# Patient Record
Sex: FEMALE | Race: WHITE | NOT HISPANIC OR LATINO | Employment: OTHER | URBAN - METROPOLITAN AREA
[De-identification: names, ages, dates, MRNs, and addresses within clinical notes are randomized per-mention and may not be internally consistent; named-entity substitution may affect disease eponyms.]

---

## 2017-01-18 ENCOUNTER — OFFICE VISIT (OUTPATIENT)
Dept: FAMILY MEDICINE | Facility: CLINIC | Age: 62
End: 2017-01-18
Payer: COMMERCIAL

## 2017-01-18 VITALS
HEIGHT: 62 IN | DIASTOLIC BLOOD PRESSURE: 96 MMHG | BODY MASS INDEX: 24.38 KG/M2 | HEART RATE: 94 BPM | OXYGEN SATURATION: 97 % | SYSTOLIC BLOOD PRESSURE: 140 MMHG | TEMPERATURE: 99 F | WEIGHT: 132.5 LBS

## 2017-01-18 DIAGNOSIS — J01.90 ACUTE BACTERIAL RHINOSINUSITIS: Primary | ICD-10-CM

## 2017-01-18 DIAGNOSIS — B96.89 ACUTE BACTERIAL RHINOSINUSITIS: Primary | ICD-10-CM

## 2017-01-18 PROCEDURE — 99999 PR PBB SHADOW E&M-EST. PATIENT-LVL III: CPT | Mod: PBBFAC,,, | Performed by: NURSE PRACTITIONER

## 2017-01-18 PROCEDURE — 1159F MED LIST DOCD IN RCRD: CPT | Mod: S$GLB,,, | Performed by: NURSE PRACTITIONER

## 2017-01-18 PROCEDURE — 99214 OFFICE O/P EST MOD 30 MIN: CPT | Mod: S$GLB,,, | Performed by: NURSE PRACTITIONER

## 2017-01-18 RX ORDER — MONTELUKAST SODIUM 10 MG/1
10 TABLET ORAL EVERY MORNING
Qty: 30 TABLET | Refills: 0 | Status: SHIPPED | OUTPATIENT
Start: 2017-01-18 | End: 2017-02-17

## 2017-01-18 RX ORDER — LEVOCETIRIZINE DIHYDROCHLORIDE 5 MG/1
5 TABLET, FILM COATED ORAL NIGHTLY
Qty: 30 TABLET | Refills: 0 | Status: SHIPPED | OUTPATIENT
Start: 2017-01-18 | End: 2017-10-16

## 2017-01-18 RX ORDER — AMOXICILLIN AND CLAVULANATE POTASSIUM 875; 125 MG/1; MG/1
1 TABLET, FILM COATED ORAL 2 TIMES DAILY
Qty: 20 TABLET | Refills: 0 | Status: SHIPPED | OUTPATIENT
Start: 2017-01-18 | End: 2017-01-28

## 2017-01-18 NOTE — PATIENT INSTRUCTIONS

## 2017-01-18 NOTE — PROGRESS NOTES
History of Present Illness   Nan Avina is a 62 y.o. woman with medical history as listed below who presents today with four day history of nasal congestion, PND, scratchy throat, ear itching and fullness, and sinus pressure. She also complains of a mild cough that is dry and hacking. She has itchy watery eyes. She denies associated fever, chills, or body aches. She has been taking Allegra with no relief in symptoms. She has no additional complaints and is otherwise healthy on today's visit.      Past Medical History   Diagnosis Date    Allergic rhinitis, seasonal 2016    Fever blister     Migraine headache     Migraine syndrome 2016    Polycystic ovarian syndrome        Past Surgical History   Procedure Laterality Date    Tonsillectomy, adenoidectomy      Breast biopsy      Oophorectomy      Dilation and curettage of uterus      Vocal cord nodules      Incisional breast biopsy  2004    [facility]rectomy  2003     [facility]       Social History     Social History    Marital status:      Spouse name: N/A    Number of children: N/A    Years of education: N/A     Occupational History    Unemployed      Social History Main Topics    Smoking status: Never Smoker    Smokeless tobacco: Never Used    Alcohol use 0.6 oz/week     1 Glasses of wine per week      Comment: occasional    Drug use: None    Sexual activity: Yes     Partners: Male     Birth control/ protection: Surgical     Other Topics Concern    None     Social History Narrative    . First grandchild born 2012.     Works at a school.        Family History   Problem Relation Age of Onset    Cancer Brother      The patient's brother  from cancer at age 58 unknown source of cancer possible liver    Diabetes Brother     Breast cancer Maternal Aunt     Breast cancer Maternal Aunt     Stroke Father     Coronary artery disease Father     Coronary artery disease Mother     Colon cancer Neg Hx     Ovarian  "cancer Neg Hx     Hypertension Neg Hx     Breast cancer Maternal Aunt        Review of Systems  Review of Systems   Constitutional: Positive for malaise/fatigue. Negative for chills and fever.   HENT: Positive for congestion, ear pain and sore throat. Negative for ear discharge.    Eyes: Negative for discharge and redness.   Respiratory: Positive for cough. Negative for sputum production, shortness of breath and wheezing.    Cardiovascular: Negative for chest pain.   Gastrointestinal: Negative for nausea and vomiting.   Neurological: Positive for headaches.     A complete review of systems was otherwise negative.    Physical Exam  Visit Vitals    BP (!) 140/96 (BP Location: Right arm, Patient Position: Sitting, BP Method: Manual)    Pulse 94    Temp 98.9 °F (37.2 °C) (Oral)    Ht 5' 2" (1.575 m)    Wt 60.1 kg (132 lb 7.9 oz)    SpO2 97%    BMI 24.23 kg/m2     General appearance: alert, appears stated age, cooperative and no distress  Eyes: negative findings: lids and lashes normal and conjunctivae and sclerae normal  Ears: bilateral bulging TM with middle ear effusion  Nose: clear discharge, moderate congestion, turbinates red, swollen, sinus tenderness bilateral  Throat: lips, mucosa, and tongue normal; teeth and gums normal and moderate oropharyngeal erythema with PND  Lungs: clear to auscultation bilaterally  Heart: regular rate and rhythm, S1, S2 normal, no murmur, click, rub or gallop  Lymph nodes: Cervical, supraclavicular, and axillary nodes normal.  Neurologic: Grossly normal    Assessment/Plan  Acute bacterial rhinosinusitis  Given duration with initial improvement followed by worsening, I have treated patient with Augmentin. Xyzal and Singulair for symptomatic treatment. Patient has refused Flonase or other nasal spray despite discussing benefits. Ibuprofen or Tylenol should for fever and body aches occur. Plenty of rest and plenty of fluids. She will contact me if symptoms worsen or fail to " improve. She has verbalized understanding and is in agreement with plan of care.  -     levocetirizine (XYZAL) 5 MG tablet; Take 1 tablet (5 mg total) by mouth every evening.  Dispense: 30 tablet; Refill: 0  -     amoxicillin-clavulanate 875-125mg (AUGMENTIN) 875-125 mg per tablet; Take 1 tablet by mouth 2 (two) times daily.  Dispense: 20 tablet; Refill: 0  -     montelukast (SINGULAIR) 10 mg tablet; Take 1 tablet (10 mg total) by mouth every morning.  Dispense: 30 tablet; Refill: 0      Return if symptoms worsen or fail to improve.

## 2017-01-18 NOTE — MR AVS SNAPSHOT
[facility]  [address] [place] [address]  Phone: [phone]  Fax: [phone]                  Nan Avina   2017 4:00 PM   Office Visit    Description:  Female : 1955   Provider:  Chelsey Ramesh NP   Department:  [facility] Medicine           Reason for Visit     URI           Diagnoses this Visit        Comments    Acute bacterial rhinosinusitis    -  Primary            To Do List           Goals (5 Years of Data)     None      Follow-Up and Disposition     Return if symptoms worsen or fail to improve.       These Medications        Disp Refills Start End    levocetirizine (XYZAL) 5 MG tablet 30 tablet 0 2017    Take 1 tablet (5 mg total) by mouth every evening. - Oral    Pharmacy: [facility] [place] [address] [place] [address] [address] Ph #: [phone]       amoxicillin-clavulanate 875-125mg (AUGMENTIN) 875-125 mg per tablet 20 tablet 0 2017    Take 1 tablet by mouth 2 (two) times daily. - Oral    Pharmacy: [facility] - [address] Ph #: [phone]       montelukast (SINGULAIR) 10 mg tablet 30 tablet 0 2017    Take 1 tablet (10 mg total) by mouth every morning. - Oral    Pharmacy: [facility] - [place] [place] [address] [address] Ph #: [phone]         [facility]     [facility] -  Assistance  Registered nurses in the [facility] provide clinical advisement, health education, appointment booking, and other advisory services.  Call for this free service at [phone].             Medications           Message regarding Medications     Verify the changes and/or additions to your medication regime listed below are the same as discussed with your clinician today.  If any of these changes or additions are incorrect, please notify your healthcare provider.        START taking these  "NEW medications        Refills    levocetirizine (XYZAL) 5 MG tablet 0    Sig: Take 1 tablet (5 mg total) by mouth every evening.    Class: Normal    Route: Oral    amoxicillin-clavulanate 875-125mg (AUGMENTIN) 875-125 mg per tablet 0    Sig: Take 1 tablet by mouth 2 (two) times daily.    Class: Normal    Route: Oral    montelukast (SINGULAIR) 10 mg tablet 0    Sig: Take 1 tablet (10 mg total) by mouth every morning.    Class: Normal    Route: Oral      STOP taking these medications     ketoconazole (NIZORAL) 2 % shampoo Apply topically once daily.           Verify that the below list of medications is an accurate representation of the medications you are currently taking.  If none reported, the list may be blank. If incorrect, please contact your healthcare provider. Carry this list with you in case of emergency.           Current Medications     estradiol (VIVELLE-DOT) 0.0375 mg/24 hr APPLY ONE PATCH onto the skin TWICE A WEEK    valacyclovir (VALTREX) 1000 MG tablet 2 pills the repeat 2 pills 12 hrs later    amoxicillin-clavulanate 875-125mg (AUGMENTIN) 875-125 mg per tablet Take 1 tablet by mouth 2 (two) times daily.    levocetirizine (XYZAL) 5 MG tablet Take 1 tablet (5 mg total) by mouth every evening.    montelukast (SINGULAIR) 10 mg tablet Take 1 tablet (10 mg total) by mouth every morning.           Clinical Reference Information           Vital Signs - Last Recorded  Most recent update: 1/18/2017  4:11 PM by Rayne Queen    BP Pulse Temp Ht Wt SpO2    (!) 140/96 (BP Location: Right arm, Patient Position: Sitting, BP Method: Manual) 94 98.9 °F (37.2 °C) (Oral) 5' 2" (1.575 m) 60.1 kg (132 lb 7.9 oz) 97%    BMI                24.23 kg/m2          Blood Pressure          Most Recent Value    BP  (!)  140/96      Allergies as of 1/18/2017     No Known Allergies      Immunizations Administered on Date of Encounter - 1/18/2017     None      Instructions      Acute Bacterial Rhinosinusitis (ABRS)  Acute " bacterial rhinosinusitis (ABRS) is an infection of your nasal cavity and sinuses. Its caused by bacteria. Acute means that youve had symptoms for less than 12 weeks.  Understanding your sinuses  The nasal cavity is the large air-filled space behind your nose. The sinuses are a group of spaces formed by the bones of your face. They connect with your nasal cavity. ABRS causes the tissue lining these spaces to become inflamed. Mucus may not drain normally. This leads to facial pain and other symptoms.  What causes ABRS?  ABRS most often follows an upper respiratory infection caused by a virus. Bacteria then infect the lining of your nasal cavity and sinuses. But you can also get ABRS if you have:  · Nasal allergies  · Long-term nasal swelling and congestion not caused by allergies  · Blockage in the nose  Symptoms of ABRS  The symptoms of ABRS may be different for each person, and can include:  · Nasal congestion  · Runny nose  · Fluid draining from the nose down the throat (postnasal drip)  · Headache  · Cough  · Pain in the sinuses  · Thick, colored fluid from the nose (mucus)  · Fever  Diagnosing ABRS  ABRS may be diagnosed if youve had an upper respiratory infection like a cold and cough for longer than 10 to 14 days. Your health care provider will ask about your symptoms and your medical history. The provider will check your vital signs, including your temperature. Youll have a physical exam. The health care provider will check your ears, nose, and throat. You likely wont need any tests. If ABRS comes back, you may have a culture or other tests.  Treatment for ABRS  Treatment may include:  · Antibiotic medicine. This is for symptoms that last for at least 10 to 14 days.  · Nasal corticosteroid medicine. Drops or spray used in the nose can lessen swelling and congestion.  · Over-the-counter pain medicine. This is to lessen sinus pain and pressure.  · Nasal decongestant medicine. Spray or drops may help to  lessen congestion. Do not use them for more than a few days.  · Salt wash (saline irrigation). This can help to loosen mucus.  Possible complications of ABRS  ABRS may come back or become long-term (chronic).  In rare cases, ABRS may cause complications such as:   · Inflamed tissue around the brain and spinal cord (meningitis)  · Inflamed tissue around the eyes (orbital cellulitis)  · Inflamed bones around the sinuses (osteitis)  These problems may need to be treated in a hospital with intravenous (IV) antibiotic medicine or surgery.  When to call the health care provider  Call your health care provider if you have any of the following:  · Symptoms that dont get better, or get worse  · Symptoms that dont get better after 3 to 5 days on antibiotics  · Trouble seeing  · Swelling around your eyes  · Confusion or trouble staying awake      © 2294-7150 [employer]. [address] [place] [address]. All rights reserved. This information is not intended as a substitute for professional medical care. Always follow your healthcare professional's instructions.

## 2017-01-23 DIAGNOSIS — E89.40 POSTSURGICAL MENOPAUSE: ICD-10-CM

## 2017-01-23 RX ORDER — ESTRADIOL 0.04 MG/D
FILM, EXTENDED RELEASE TRANSDERMAL
Qty: 8 PATCH | Refills: 1 | Status: SHIPPED | OUTPATIENT
Start: 2017-01-23 | End: 2017-03-25 | Stop reason: SDUPTHER

## 2017-03-25 DIAGNOSIS — E89.40 POSTSURGICAL MENOPAUSE: ICD-10-CM

## 2017-03-27 ENCOUNTER — TELEPHONE (OUTPATIENT)
Dept: OBSTETRICS AND GYNECOLOGY | Facility: CLINIC | Age: 62
End: 2017-03-27

## 2017-03-27 DIAGNOSIS — Z12.31 SCREENING MAMMOGRAM, ENCOUNTER FOR: Primary | ICD-10-CM

## 2017-03-27 RX ORDER — ESTRADIOL 0.04 MG/D
PATCH, EXTENDED RELEASE TRANSDERMAL
Qty: 8 PATCH | Refills: 0 | Status: SHIPPED | OUTPATIENT
Start: 2017-03-27 | End: 2017-04-27 | Stop reason: SDUPTHER

## 2017-03-27 NOTE — TELEPHONE ENCOUNTER
Pt says she needs a PA for her Vivelle dot patch told pt I will submit a request online. Pt communicated understanding.    Scheduled pt's MMG for 3/31/2017 at 11:00 AM at [facility].Pt communicated understanding.

## 2017-03-27 NOTE — TELEPHONE ENCOUNTER
----- Message from Ambrocio Murray sent at 3/27/2017  2:15 PM CDT -----  Contact: Patient  x_ 1st Request  _ 2nd Request  _ 3rd Request    Who: MARIO RUSSELL [[id number]]    Why: Patient is calling in regards to needing a refill on her RX and needing a mmg order. Patient is needing a call back from staff.    What Number to Call Back: Patient can be reached at [phone].    When to Expect a call back: (Before the end of the day)  -- if call after 3:00 call back will be tomorrow.

## 2017-03-31 ENCOUNTER — HOSPITAL ENCOUNTER (OUTPATIENT)
Dept: RADIOLOGY | Facility: HOSPITAL | Age: 62
Discharge: HOME | End: 2017-03-31
Attending: OBSTETRICS & GYNECOLOGY
Payer: COMMERCIAL

## 2017-03-31 DIAGNOSIS — Z12.31 SCREENING MAMMOGRAM, ENCOUNTER FOR: ICD-10-CM

## 2017-03-31 PROCEDURE — 77067 SCR MAMMO BI INCL CAD: CPT | Mod: TC

## 2017-03-31 PROCEDURE — 77067 SCR MAMMO BI INCL CAD: CPT | Mod: 26,,, | Performed by: RADIOLOGY

## 2017-04-11 ENCOUNTER — TELEPHONE (OUTPATIENT)
Dept: OBSTETRICS AND GYNECOLOGY | Facility: CLINIC | Age: 62
End: 2017-04-11

## 2017-04-11 NOTE — TELEPHONE ENCOUNTER
Left message requesting call back at [phone]. Pt's last [facility] was 8/13/2017. Received fax from [facility] requesting refill for Vivelle Dot patch. Pt must schedule [facility] to obtain additional refills.

## 2017-04-13 DIAGNOSIS — Z11.59 NEED FOR HEPATITIS C SCREENING TEST: ICD-10-CM

## 2017-04-27 DIAGNOSIS — E89.40 POSTSURGICAL MENOPAUSE: ICD-10-CM

## 2017-05-01 RX ORDER — ESTRADIOL 0.04 MG/D
PATCH, EXTENDED RELEASE TRANSDERMAL
Qty: 8 PATCH | Refills: 0 | Status: SHIPPED | OUTPATIENT
Start: 2017-05-01 | End: 2017-05-17 | Stop reason: SDUPTHER

## 2017-05-17 ENCOUNTER — OFFICE VISIT (OUTPATIENT)
Dept: OBSTETRICS AND GYNECOLOGY | Facility: CLINIC | Age: 62
End: 2017-05-17
Payer: COMMERCIAL

## 2017-05-17 VITALS
BODY MASS INDEX: 24.99 KG/M2 | DIASTOLIC BLOOD PRESSURE: 70 MMHG | SYSTOLIC BLOOD PRESSURE: 106 MMHG | HEIGHT: 62 IN | WEIGHT: 135.81 LBS

## 2017-05-17 DIAGNOSIS — B00.89 RECURRENT ORAL HERPES SIMPLEX INFECTION: ICD-10-CM

## 2017-05-17 DIAGNOSIS — E89.40 POSTSURGICAL MENOPAUSE: ICD-10-CM

## 2017-05-17 DIAGNOSIS — Z12.31 SCREENING MAMMOGRAM FOR HIGH-RISK PATIENT: Primary | ICD-10-CM

## 2017-05-17 DIAGNOSIS — Z01.419 ENCOUNTER FOR GYNECOLOGICAL EXAMINATION WITHOUT ABNORMAL FINDING: ICD-10-CM

## 2017-05-17 PROCEDURE — 99999 PR PBB SHADOW E&M-EST. PATIENT-LVL III: CPT | Mod: PBBFAC,,, | Performed by: OBSTETRICS & GYNECOLOGY

## 2017-05-17 PROCEDURE — 99396 PREV VISIT EST AGE 40-64: CPT | Mod: S$GLB,,, | Performed by: OBSTETRICS & GYNECOLOGY

## 2017-05-17 RX ORDER — VALACYCLOVIR HYDROCHLORIDE 1 G/1
TABLET, FILM COATED ORAL
Qty: 30 TABLET | Refills: 12 | Status: SHIPPED | OUTPATIENT
Start: 2017-05-17 | End: 2018-02-09 | Stop reason: SDUPTHER

## 2017-05-17 RX ORDER — ESTRADIOL 0.04 MG/D
FILM, EXTENDED RELEASE TRANSDERMAL
Qty: 8 PATCH | Refills: 12 | Status: SHIPPED | OUTPATIENT
Start: 2017-05-17 | End: 2018-05-30 | Stop reason: SDUPTHER

## 2017-05-17 NOTE — PROGRESS NOTES
Subjective:       Patient ID: Nan Avina is a 62 y.o. female.    Chief Complaint:  Well Woman; Low-back Pain (Pt reports having occasional low-back pain recently.); and Fatigue (Pt says that she has had very low energy lately.)      History of Present Illness  HPI  Nan Avina is a 62 y.o. female  here for her annual GYN exam.    She describes her periods as stopped with Hysterectomy.   Denies break through/postmenopausal bleeding.   Denies vaginal itching or irritation.  Denies vaginal discharge.  She is sexually active. She has had 1 partner for the past 32 years .   History of abnormal pap: No  Last Pap: was normal  Last MMG: normal--routine follow-up in 12 months  Last Colonoscopy:  colonoscopy 13 years ago(2004) without abnormalities.  Denies domestic violence. She does feel safe at home.     Past Medical History:   Diagnosis Date    Allergic rhinitis, seasonal 2016    Fever blister     Migraine headache     Migraine syndrome 2016    Polycystic ovarian syndrome      Past Surgical History:   Procedure Laterality Date    BREAST BIOPSY      DILATION AND CURETTAGE OF UTERUS      [facility]CTOMY      [facility]    INCISIONAL BREAST BIOPSY  2004    OOPHORECTOMY      TONSILLECTOMY, ADENOIDECTOMY      Vocal cord nodules       Social History     Social History    Marital status:      Spouse name: N/A    Number of children: N/A    Years of education: N/A     Occupational History    Unemployed      Social History Main Topics    Smoking status: Never Smoker    Smokeless tobacco: Never Used    Alcohol use 0.6 oz/week     1 Glasses of wine per week      Comment: occasional    Drug use: No    Sexual activity: Yes     Partners: Male     Birth control/ protection: Surgical      Comment:  since      Other Topics Concern    Not on file     Social History Narrative    . First grandchild born 2012.     Works at a school.      Family History  "  Problem Relation Age of Onset    Cancer Brother      The patient's brother  from cancer at age 58 unknown source of cancer possible liver    Diabetes Brother     Breast cancer Maternal Aunt     Breast cancer Maternal Aunt     Stroke Father     Coronary artery disease Father     Coronary artery disease Mother     Breast cancer Maternal Aunt     Colon cancer Neg Hx     Ovarian cancer Neg Hx     Hypertension Neg Hx      OB History      Para Term  AB TAB SAB Ectopic Multiple Living    3 3 3       3          /70  Ht 5' 2" (1.575 m)  Wt 61.6 kg (135 lb 12.9 oz)  BMI 24.84 kg/m2      GYN & OB History  No LMP recorded. Patient has had a hysterectomy.   Date of Last Pap: No result found    OB History    Para Term  AB SAB TAB Ectopic Multiple Living   3 3 3       3      # Outcome Date GA Lbr Hang/2nd Weight Sex Delivery Anes PTL Lv   3 Term      Vag-Spont   Y   2 Term      Vag-Spont   Y   1 Term      Vag-Spont   Y          Review of Systems  Review of Systems   Constitutional: Negative for activity change, appetite change, fatigue and unexpected weight change.   HENT: Negative.    Eyes: Negative for visual disturbance.   Respiratory: Negative for shortness of breath and wheezing.    Cardiovascular: Negative for chest pain, palpitations and leg swelling.   Gastrointestinal: Negative for abdominal pain, bloating and blood in stool.   Endocrine: Negative for diabetes and hair loss.   Genitourinary: Negative for decreased libido, dyspareunia and postmenopausal bleeding.   Musculoskeletal: Negative for back pain and joint swelling.   Skin:  Negative for no acne and hair changes.   Neurological: Negative for headaches.   Hematological: Does not bruise/bleed easily.   Psychiatric/Behavioral: Positive for sleep disturbance. Negative for depression. The patient is nervous/anxious.    Breast: Negative for breast pain and nipple discharge          Objective:    Physical Exam: "   Constitutional: She is oriented to person, place, and time. She appears well-developed and well-nourished.    HENT:   Head: Normocephalic and atraumatic.    Eyes: EOM are normal. Pupils are equal, round, and reactive to light.    Neck: Normal range of motion. Neck supple.    Cardiovascular: Normal rate and regular rhythm.     Pulmonary/Chest: Effort normal and breath sounds normal.   BREASTS: Symmetrical, no skin changes or visible lesions.  No palpable masses, nipple discharge bilaterally.          Abdominal: Soft. Bowel sounds are normal.     Genitourinary: Vagina normal. Pelvic exam was performed with patient supine.   Genitourinary Comments: PELVIC: Normal external female genitalia without lesions. Normal hair distribution. Adequate perineal body, normal urethral meatus. Vagina atrophic without lesions or discharge. No significant cystocele or rectocele. Bimanual exam shows uterus and cervix to be surgically absent. Adnexa without masses or tenderness.  RECTAL: Deferred             Musculoskeletal: Normal range of motion and moves all extremeties.       Neurological: She is alert and oriented to person, place, and time.    Skin: Skin is warm and dry.    Psychiatric: She has a normal mood and affect.          Assessment:        1. Screening mammogram for high-risk patient    2. Encounter for gynecological examination without abnormal finding    3. Postsurgical menopause    4. Recurrent oral herpes simplex infection                Plan:      1. Encounter for gynecological examination without abnormal finding  COUNSELING:  The patient was counseled today on osteoporosis prevention, calcium supplementation, and regular weight bearing exercise. The patient was also counseled today on ACS PAP guidelines, with recommendations for yearly pelvic exams unless their uterus, cervix, and ovaries were removed for benign reasons; in that case, examinations every 3-5 years are recommended. The patient was also counseled  regarding monthly breast self-examination, routine STD screening for at-risk populations, prophylactic immunizations for transmitted infections such as HPV, Pertussis, or Influenza as appropriate, and yearly mammograms when indicated by ACS guidelines. She was advised to see her primary care physician for all other health maintenance.   FOLLOW-UP with me for next routine visit.         2. Postsurgical menopause    - estradiol (VIVELLE-DOT) 0.0375 mg/24 hr; apply one PATCH onto the skin TWICE A WEEK  Dispense: 8 patch; Refill: 12    3. Recurrent oral herpes simplex infection    - valacyclovir (VALTREX) 1000 MG tablet; 2 pills the repeat 2 pills 12 hrs later  Dispense: 30 tablet; Refill: 12    4. Screening mammogram for high-risk patient    - Mammo Digital Screening Bilat with Tomosynthesis CAD; Future       Return in about 1 year (around 5/17/2018).

## 2017-10-06 ENCOUNTER — TELEPHONE (OUTPATIENT)
Dept: FAMILY MEDICINE | Facility: CLINIC | Age: 62
End: 2017-10-06

## 2017-10-06 ENCOUNTER — PATIENT MESSAGE (OUTPATIENT)
Dept: FAMILY MEDICINE | Facility: CLINIC | Age: 62
End: 2017-10-06

## 2017-10-06 DIAGNOSIS — Z12.11 COLON CANCER SCREENING: ICD-10-CM

## 2017-10-06 NOTE — TELEPHONE ENCOUNTER
----- Message from Tara Styles sent at 10/5/2017  4:08 PM CDT -----  Contact: self  Pt is asking for a clearance for Surgery.    [phone] or [phone].

## 2017-10-15 NOTE — PROGRESS NOTES
Chief complaint  preop      62-year-old white female seen in consultation from her ophthalmologist Dr. Hipolito mahan preop for monitored anesthesia for cataract extraction.  She does have a moderate amount of reduced vision in the right eye.  She's not sure if she will need the other cataract done.  She has had no recent cardiopulmonary or infectious symptoms.  She has had no prior problems with prior surgeries or anesthesia.  Stated history of hypertension in the chart that she has never been on medications and blood pressure is normal so that may be questionable.  Regarding her migraines, since being on a hormone patch she has less frequent headaches and less severe headaches.  They occur about 4 times per year.  They still last about 3 days.  She was concerned about using 3 ibuprofen twice a day for those 3 days.  We discussed adding Tylenol as a means to possibly reduce one of the ibuprofen pills.  As long as she gets no GI symptoms I think this minimal use of ibuprofen is acceptable since it does effectively help with the headaches.  In review, looks like only labs on file were 2004.  She does not remember having any others and I discussed the importance as well as having a physical every year.  She would like to wait until next year when she turns 63 in January.  She does report having had a normal colonoscopy in the past probably in her 50s so obviously past the 10 year jarett and she may be willing to have that done.  We also discussed a lot of bowel issues that her mom is having intermittent mom is a 95-year-old patient of mine.          ROS:   CONST: weight stable. EYES: no vision change. ENT: no sore throat. CV: no chest pain w/ exertion. RESP: no shortness of breath. GI: no nausea, vomiting, diarrhea. No dysphagia. : no urinary issues. MUSCULOSKELETAL: no new myalgias or arthralgias. SKIN: no new changes. NEURO: no focal deficits. PSYCH: no new issues. ENDOCRINE: no polyuria. HEME: no lymph nodes.  "ALLERGY: no general pruritis.      Past medical history :  1.  Hypertension ???  2.  Migraines  3.  Fever blisters  4.  ALLERGIES  5.  Scalp dermatitis  6. Normal colonoscopy in past      Past Surgical History   Procedure Laterality Date    Tonsillectomy, adenoidectomy      Breast biopsy      Oophorectomy      Dilation and curettage of uterus      Vocal cord nodules      Incisional breast biopsy  2004    Hysterectomy  2003     [facility]       Vital signs as above  Gen: no distress  EYES: conjunctiva clear, non-icteric, PERRL  ENT: nose clear, nasal mucosa normal, oropharynx clear and moist, teeth good  NECK:supple, thyroid non-palpable  RESP: effort is good, lungs clear  CV: heart RRR w/o murmur, gallops or rubs; no carotid bruits, no edema  GI: abdomen soft, non-distended, non-tender, no hepatosplenomegaly  MS: gait normal, no clubbing or cyanosis of the digits  SKIN: no rashes, warm to touch      Prior labs, x-ray reports reviewed/mammogram report    Nan was seen today for pre-op exam.    Diagnoses and all orders for this visit:    Preoperative examination, low cardiopulmonary risk and patient is cleared for monitored anesthesia.  I completed her form, it was faxed in the original given back to the patient.  Recommend she follow-up for physical and lab work but no particular lab work and so forth as needed in reference to her clearance.    Cataract, unspecified cataract type, unspecified laterality, associated with visual disturbance and so therefore will have it corrected    Migraine syndrome, stable pattern, apparently some improvement with hormone replacement, discussed issues referable to use of ibuprofen    Other orders  -     Cancel: Lipid panel; Future              , Patient does exhibit a significant amount of anxiety regarding the above symptoms in excess to the clinical note would not be therapeutic"This note will not be shared with the patient."    "

## 2017-10-16 ENCOUNTER — OFFICE VISIT (OUTPATIENT)
Dept: FAMILY MEDICINE | Facility: CLINIC | Age: 62
End: 2017-10-16
Payer: COMMERCIAL

## 2017-10-16 VITALS
HEIGHT: 63 IN | HEART RATE: 69 BPM | TEMPERATURE: 98 F | SYSTOLIC BLOOD PRESSURE: 120 MMHG | OXYGEN SATURATION: 98 % | BODY MASS INDEX: 23.75 KG/M2 | WEIGHT: 134.06 LBS | DIASTOLIC BLOOD PRESSURE: 82 MMHG

## 2017-10-16 DIAGNOSIS — G43.909 MIGRAINE SYNDROME: ICD-10-CM

## 2017-10-16 DIAGNOSIS — Z01.818 PREOPERATIVE EXAMINATION: Primary | ICD-10-CM

## 2017-10-16 DIAGNOSIS — H26.9 CATARACT, UNSPECIFIED CATARACT TYPE, UNSPECIFIED LATERALITY: ICD-10-CM

## 2017-10-16 PROCEDURE — 99244 OFF/OP CNSLTJ NEW/EST MOD 40: CPT | Mod: S$GLB,,, | Performed by: INTERNAL MEDICINE

## 2017-10-16 PROCEDURE — 99999 PR PBB SHADOW E&M-EST. PATIENT-LVL III: CPT | Mod: PBBFAC,,, | Performed by: INTERNAL MEDICINE

## 2017-10-16 RX ORDER — BESIFLOXACIN 6 MG/ML
SUSPENSION OPHTHALMIC
COMMUNITY
Start: 2017-09-11 | End: 2019-07-09

## 2017-10-16 RX ORDER — DUREZOL 0.5 MG/ML
EMULSION OPHTHALMIC
COMMUNITY
Start: 2017-09-19 | End: 2019-07-09

## 2017-10-16 RX ORDER — PREDNISOLONE ACETATE 10 MG/ML
SUSPENSION/ DROPS OPHTHALMIC
COMMUNITY
Start: 2017-09-12 | End: 2019-07-09

## 2018-02-09 DIAGNOSIS — B00.89 RECURRENT ORAL HERPES SIMPLEX INFECTION: ICD-10-CM

## 2018-02-09 RX ORDER — VALACYCLOVIR HYDROCHLORIDE 1 G/1
TABLET, FILM COATED ORAL
Qty: 30 TABLET | Refills: 12 | Status: SHIPPED | OUTPATIENT
Start: 2018-02-09 | End: 2019-07-09 | Stop reason: SDUPTHER

## 2018-05-30 DIAGNOSIS — E89.40 POSTSURGICAL MENOPAUSE: ICD-10-CM

## 2018-05-30 RX ORDER — ESTRADIOL 0.04 MG/D
PATCH, EXTENDED RELEASE TRANSDERMAL
Qty: 8 PATCH | Refills: 0 | Status: SHIPPED | OUTPATIENT
Start: 2018-05-30 | End: 2018-06-30 | Stop reason: SDUPTHER

## 2018-06-14 DIAGNOSIS — Z11.59 NEED FOR HEPATITIS C SCREENING TEST: ICD-10-CM

## 2018-06-30 DIAGNOSIS — E89.40 POSTSURGICAL MENOPAUSE: ICD-10-CM

## 2018-07-02 RX ORDER — ESTRADIOL 0.04 MG/D
PATCH, EXTENDED RELEASE TRANSDERMAL
Qty: 8 PATCH | Refills: 0 | Status: SHIPPED | OUTPATIENT
Start: 2018-07-02 | End: 2018-07-31 | Stop reason: SDUPTHER

## 2018-07-28 DIAGNOSIS — E89.40 POSTSURGICAL MENOPAUSE: ICD-10-CM

## 2018-07-30 RX ORDER — ESTRADIOL 0.04 MG/D
PATCH, EXTENDED RELEASE TRANSDERMAL
Qty: 8 PATCH | OUTPATIENT
Start: 2018-07-30

## 2018-07-31 DIAGNOSIS — E89.40 POSTSURGICAL MENOPAUSE: ICD-10-CM

## 2018-07-31 RX ORDER — ESTRADIOL 0.04 MG/D
1 FILM, EXTENDED RELEASE TRANSDERMAL
Qty: 8 PATCH | Refills: 0 | Status: SHIPPED | OUTPATIENT
Start: 2018-08-02 | End: 2018-08-24 | Stop reason: SDUPTHER

## 2018-08-20 ENCOUNTER — TELEPHONE (OUTPATIENT)
Dept: OBSTETRICS AND GYNECOLOGY | Facility: CLINIC | Age: 63
End: 2018-08-20

## 2018-08-20 DIAGNOSIS — Z12.39 SCREENING BREAST EXAMINATION: Primary | ICD-10-CM

## 2018-08-20 NOTE — TELEPHONE ENCOUNTER
----- Message from Dena Mooney sent at 8/20/2018  1:55 PM CDT -----  Contact: pt   Name of Who is Calling: MARIO RUSSELL [[id number]]    What is the request in detail: Patient is requesting mmg orders......Please contact to further discuss and advise      Can the clinic reply by [facility]: No     [facility] [phone]

## 2018-08-22 ENCOUNTER — TELEPHONE (OUTPATIENT)
Dept: OBSTETRICS AND GYNECOLOGY | Facility: CLINIC | Age: 63
End: 2018-08-22

## 2018-08-22 DIAGNOSIS — Z12.31 VISIT FOR SCREENING MAMMOGRAM: Primary | ICD-10-CM

## 2018-08-22 NOTE — TELEPHONE ENCOUNTER
----- Message from Yesenia Tao sent at 8/22/2018 10:13 AM CDT -----  Name of Who is Calling:MARIO RUSSELL [[id number]]      What is the request in detail: Pt is having issues with her insurance paying for her Mammogram. please call to further discuss and advise.      Can the clinic reply by [facility]:    No       What Number to Call Back if not in [facility]: [phone]

## 2018-08-24 ENCOUNTER — HOSPITAL ENCOUNTER (OUTPATIENT)
Dept: RADIOLOGY | Facility: HOSPITAL | Age: 63
Discharge: HOME | End: 2018-08-24
Attending: OBSTETRICS & GYNECOLOGY
Payer: COMMERCIAL

## 2018-08-24 DIAGNOSIS — Z12.31 VISIT FOR SCREENING MAMMOGRAM: ICD-10-CM

## 2018-08-24 DIAGNOSIS — E89.40 POSTSURGICAL MENOPAUSE: ICD-10-CM

## 2018-08-24 PROCEDURE — 77063 BREAST TOMOSYNTHESIS BI: CPT | Mod: 26,,, | Performed by: RADIOLOGY

## 2018-08-24 PROCEDURE — 77067 SCR MAMMO BI INCL CAD: CPT | Mod: 26,,, | Performed by: RADIOLOGY

## 2018-08-24 PROCEDURE — 77063 BREAST TOMOSYNTHESIS BI: CPT | Mod: TC,PO

## 2018-08-24 RX ORDER — ESTRADIOL 0.04 MG/D
PATCH, EXTENDED RELEASE TRANSDERMAL
Qty: 8 PATCH | Refills: 0 | Status: SHIPPED | OUTPATIENT
Start: 2018-08-24 | End: 2018-09-24 | Stop reason: SDUPTHER

## 2018-09-24 DIAGNOSIS — E89.40 POSTSURGICAL MENOPAUSE: ICD-10-CM

## 2018-09-24 RX ORDER — ESTRADIOL 0.04 MG/D
PATCH, EXTENDED RELEASE TRANSDERMAL
Qty: 8 PATCH | Refills: 12 | Status: SHIPPED | OUTPATIENT
Start: 2018-09-24 | End: 2019-07-26 | Stop reason: DRUGHIGH

## 2018-10-19 DIAGNOSIS — Z12.11 COLON CANCER SCREENING: ICD-10-CM

## 2019-04-12 ENCOUNTER — OFFICE VISIT (OUTPATIENT)
Dept: FAMILY MEDICINE | Facility: CLINIC | Age: 64
End: 2019-04-12
Payer: COMMERCIAL

## 2019-04-12 VITALS
HEIGHT: 62 IN | DIASTOLIC BLOOD PRESSURE: 96 MMHG | TEMPERATURE: 99 F | BODY MASS INDEX: 25.23 KG/M2 | SYSTOLIC BLOOD PRESSURE: 136 MMHG | WEIGHT: 137.13 LBS | HEART RATE: 67 BPM | OXYGEN SATURATION: 98 %

## 2019-04-12 DIAGNOSIS — G43.909 MIGRAINE SYNDROME: ICD-10-CM

## 2019-04-12 DIAGNOSIS — S20.211A CONTUSION OF RIGHT CHEST WALL, INITIAL ENCOUNTER: Primary | ICD-10-CM

## 2019-04-12 PROCEDURE — 99215 OFFICE O/P EST HI 40 MIN: CPT | Mod: S$GLB,,, | Performed by: INTERNAL MEDICINE

## 2019-04-12 PROCEDURE — 99215 PR OFFICE/OUTPT VISIT, EST, LEVL V, 40-54 MIN: ICD-10-PCS | Mod: S$GLB,,, | Performed by: INTERNAL MEDICINE

## 2019-04-12 PROCEDURE — 1101F PT FALLS ASSESS-DOCD LE1/YR: CPT | Mod: CPTII,S$GLB,, | Performed by: INTERNAL MEDICINE

## 2019-04-12 PROCEDURE — 99999 PR PBB SHADOW E&M-EST. PATIENT-LVL III: ICD-10-PCS | Mod: PBBFAC,,, | Performed by: INTERNAL MEDICINE

## 2019-04-12 PROCEDURE — 1101F PR PT FALLS ASSESS DOC 0-1 FALLS W/OUT INJ PAST YR: ICD-10-PCS | Mod: CPTII,S$GLB,, | Performed by: INTERNAL MEDICINE

## 2019-04-12 PROCEDURE — 99999 PR PBB SHADOW E&M-EST. PATIENT-LVL III: CPT | Mod: PBBFAC,,, | Performed by: INTERNAL MEDICINE

## 2019-04-12 RX ORDER — OMEPRAZOLE 40 MG/1
40 CAPSULE, DELAYED RELEASE ORAL DAILY
Qty: 30 CAPSULE | Refills: 4 | Status: SHIPPED | OUTPATIENT
Start: 2019-04-12 | End: 2022-03-24 | Stop reason: SDUPTHER

## 2019-04-12 RX ORDER — TRAMADOL HYDROCHLORIDE 50 MG/1
TABLET ORAL
Qty: 60 TABLET | Refills: 3 | Status: SHIPPED | OUTPATIENT
Start: 2019-04-12 | End: 2019-07-09

## 2019-04-12 NOTE — PROGRESS NOTES
Chief complaint  rib injury    64-year-old white female called today and made a appointment in an opening.  About 6 days ago she tripped over her dog and hit her right anterior chest on the arm of a sofa.  Moderate pain.  She has been taking 600 mg of Advil 2-3 times per day.  She has been having some pain worsening in between.  She tried adding Tylenol without any relief.  She is having some upset stomach with some belching which is new and she definitely attributed to the ibuprofen.  She also gets some headaches and she has been keeping a record of her medication use.  It looks like in the month of March she took ibuprofen 3 times per day for about 4 days on 2 separate occasions.  She used to have migraines with these current headache she does not feel her migraines as she has no nausea and the ibuprofen does help.  We discussed all the negative affects of long-term use of ibuprofen.  I would like her to use omeprazole 40 mg for periods of time where she uses the ibuprofen and to limit the use as much as possible.  She is fearful of taking other pain medications but discussed that they may be safer than the ibuprofen especially if indeed we can expect for her to need some form of analgesics for several weeks while she heals from this rib injury      ROS:   CONST:  No bruising of the skin, slight shortness of breath with splinting but no dyspnea on exertion, no hemoptysis      Past medical history :  1.  Hypertension ???  2.  Migraines  3.  Fever blisters  4.  ALLERGIES  5.  Scalp dermatitis  6. Normal colonoscopy in past      Past Surgical History   Procedure Laterality Date    Tonsillectomy, adenoidectomy      Breast biopsy      Oophorectomy      Dilation and curettage of uterus      Vocal cord nodules      Incisional breast biopsy  2004    Hysterectomy  2003     [facility]       Vital signs as above  Gen: no distress  EYES: conjunctiva clear, non-icteric, PERRL  ENT: nose clear, nasal mucosa normal,  oropharynx clear and moist, teeth good  NECK:supple, thyroid non-palpable  RESP: effort is good, lungs clear, very tender in the anterior lower rib margin extending laterally to the lateral aspect of the chest wall.  The associated soft tissues an abdomen nontender and there is no bruising, no palpable rib defect  CV: heart RRR w/o murmur, gallops or rubs; no carotid bruits, no edema  GI: abdomen soft, non-distended, non-tender, no hepatosplenomegaly  MS: gait normal, no clubbing or cyanosis of the digits  SKIN: no rashes, warm to touch      Nan was seen today for fall and flank pain.    Diagnoses and all orders for this visit:    Contusion of right chest wall, initial encounter, in the area of patient's pain she may well have fractured some of the cartilage portion of the chest wall and possibly even some of the ribs but no indication for x-ray as it would not  and she agrees.  She can apply ice to the area.  She can expected to take weeks to heal.  She can continue the ibuprofen but try reducing the dose as we add tramadol as an analgesic.  We discussed potential side effects.  She definitely needs to take omeprazole 40 mg which she takes ibuprofen for this current illness and for future use with headaches.  Discussed all the side effects of ibuprofen and so forth at great length today.  She would definitely want to get all these medication changes straight as she has moderate anxiety about taking medications and we had everything written out for her.  She was counseled at length.Total time over 45 minutes with over 50% counseling.    Migraine syndrome/headache syndrome, discussed her use of the ibuprofen and she may wish to try tramadol as an allergies it for these headaches as well          Patient also counseled related to issues about her mom who is a patient of mine.    Other orders  -     traMADol (ULTRAM) 50 mg tablet; 1-2 q 6hr prn  -     omeprazole (PRILOSEC) 40 MG capsule; Take 1  "capsule (40 mg total) by mouth once daily. Daily when taking ibuprofen               , Patient does exhibit a significant amount of anxiety regarding the above symptoms in excess to the clinical note would not be therapeutic"///"This note will not be shared with the patient."    "

## 2019-04-22 ENCOUNTER — TELEPHONE (OUTPATIENT)
Dept: FAMILY MEDICINE | Facility: CLINIC | Age: 64
End: 2019-04-22

## 2019-04-22 NOTE — TELEPHONE ENCOUNTER
Can she take Tylenol and Omeprazole together. Per PCP: yea you can stop the Tramadol and take Tylenol and Omeprazole together for as long as you have the discomfort in your rib area. Verbalized understanding.

## 2019-04-22 NOTE — TELEPHONE ENCOUNTER
----- Message from Lizzette Rae sent at 4/22/2019 11:13 AM CDT -----  Contact: self [phone]  .Type: Patient Call Back    Who called: self     What is the request in detail: Pt has some questions regarding taking omeprazole (PRILOSEC) 40 MG capsule     Can the clinic reply by [facility]? Call back     Would the patient rather a call back or a response via [facility]? Call back     Best call back number: [phone]    Additional Information:

## 2019-05-22 ENCOUNTER — TELEPHONE (OUTPATIENT)
Dept: FAMILY MEDICINE | Facility: CLINIC | Age: 64
End: 2019-05-22

## 2019-05-22 NOTE — TELEPHONE ENCOUNTER
----- Message from Tara Styles sent at 5/22/2019  4:01 PM CDT -----  ..Type:  Patient Returning Call    Who Called: pt     Who Left Message for Patient: unknown    Would the patient rather a call back or a response via [facility]? Call back     Best Call Back Number: [phone]    Additional Information: pt states she received a VM @12:51 asking her to contact Dr. Romano's office.

## 2019-07-09 ENCOUNTER — OFFICE VISIT (OUTPATIENT)
Dept: FAMILY MEDICINE | Facility: CLINIC | Age: 64
End: 2019-07-09
Payer: COMMERCIAL

## 2019-07-09 ENCOUNTER — LAB VISIT (OUTPATIENT)
Dept: LAB | Facility: HOSPITAL | Age: 64
End: 2019-07-09
Attending: INTERNAL MEDICINE
Payer: COMMERCIAL

## 2019-07-09 VITALS
HEIGHT: 63 IN | DIASTOLIC BLOOD PRESSURE: 86 MMHG | HEART RATE: 73 BPM | TEMPERATURE: 99 F | BODY MASS INDEX: 24.57 KG/M2 | SYSTOLIC BLOOD PRESSURE: 120 MMHG | OXYGEN SATURATION: 99 % | WEIGHT: 138.69 LBS

## 2019-07-09 DIAGNOSIS — R42 LIGHTHEADEDNESS: Primary | ICD-10-CM

## 2019-07-09 DIAGNOSIS — Z11.59 NEED FOR HEPATITIS C SCREENING TEST: ICD-10-CM

## 2019-07-09 DIAGNOSIS — R06.02 SOB (SHORTNESS OF BREATH): ICD-10-CM

## 2019-07-09 DIAGNOSIS — R42 LIGHTHEADEDNESS: ICD-10-CM

## 2019-07-09 DIAGNOSIS — M85.80 OSTEOPENIA, UNSPECIFIED LOCATION: ICD-10-CM

## 2019-07-09 DIAGNOSIS — B00.89 RECURRENT ORAL HERPES SIMPLEX INFECTION: ICD-10-CM

## 2019-07-09 DIAGNOSIS — R00.2 PALPITATIONS: ICD-10-CM

## 2019-07-09 DIAGNOSIS — S46.819A STRAIN OF TRAPEZIUS MUSCLE, UNSPECIFIED LATERALITY, INITIAL ENCOUNTER: ICD-10-CM

## 2019-07-09 LAB
25(OH)D3+25(OH)D2 SERPL-MCNC: 17 NG/ML (ref 30–96)
ALBUMIN SERPL BCP-MCNC: 3.8 G/DL (ref 3.5–5.2)
ALP SERPL-CCNC: 94 U/L (ref 55–135)
ALT SERPL W/O P-5'-P-CCNC: 89 U/L (ref 10–44)
ANION GAP SERPL CALC-SCNC: 8 MMOL/L (ref 8–16)
AST SERPL-CCNC: 63 U/L (ref 10–40)
BASOPHILS # BLD AUTO: 0.04 K/UL (ref 0–0.2)
BASOPHILS NFR BLD: 0.6 % (ref 0–1.9)
BILIRUB SERPL-MCNC: 0.3 MG/DL (ref 0.1–1)
BUN SERPL-MCNC: 23 MG/DL (ref 8–23)
CALCIUM SERPL-MCNC: 9.5 MG/DL (ref 8.7–10.5)
CHLORIDE SERPL-SCNC: 106 MMOL/L (ref 95–110)
CHOLEST SERPL-MCNC: 204 MG/DL (ref 120–199)
CHOLEST/HDLC SERPL: 3.5 {RATIO} (ref 2–5)
CO2 SERPL-SCNC: 28 MMOL/L (ref 23–29)
CREAT SERPL-MCNC: 0.7 MG/DL (ref 0.5–1.4)
DIFFERENTIAL METHOD: ABNORMAL
EOSINOPHIL # BLD AUTO: 0.1 K/UL (ref 0–0.5)
EOSINOPHIL NFR BLD: 1.9 % (ref 0–8)
ERYTHROCYTE [DISTWIDTH] IN BLOOD BY AUTOMATED COUNT: 13.2 % (ref 11.5–14.5)
EST. GFR  (AFRICAN AMERICAN): >60 ML/MIN/1.73 M^2
EST. GFR  (NON AFRICAN AMERICAN): >60 ML/MIN/1.73 M^2
ESTIMATED AVG GLUCOSE: 103 MG/DL (ref 68–131)
GLUCOSE SERPL-MCNC: 102 MG/DL (ref 70–110)
HBA1C MFR BLD HPLC: 5.2 % (ref 4–5.6)
HCT VFR BLD AUTO: 44.1 % (ref 37–48.5)
HDLC SERPL-MCNC: 58 MG/DL (ref 40–75)
HDLC SERPL: 28.4 % (ref 20–50)
HGB BLD-MCNC: 13.8 G/DL (ref 12–16)
IMM GRANULOCYTES # BLD AUTO: 0.01 K/UL (ref 0–0.04)
IMM GRANULOCYTES NFR BLD AUTO: 0.2 % (ref 0–0.5)
LDLC SERPL CALC-MCNC: 130.4 MG/DL (ref 63–159)
LYMPHOCYTES # BLD AUTO: 1.7 K/UL (ref 1–4.8)
LYMPHOCYTES NFR BLD: 27.6 % (ref 18–48)
MCH RBC QN AUTO: 31.5 PG (ref 27–31)
MCHC RBC AUTO-ENTMCNC: 31.3 G/DL (ref 32–36)
MCV RBC AUTO: 101 FL (ref 82–98)
MONOCYTES # BLD AUTO: 0.6 K/UL (ref 0.3–1)
MONOCYTES NFR BLD: 9.5 % (ref 4–15)
NEUTROPHILS # BLD AUTO: 3.8 K/UL (ref 1.8–7.7)
NEUTROPHILS NFR BLD: 60.2 % (ref 38–73)
NONHDLC SERPL-MCNC: 146 MG/DL
NRBC BLD-RTO: 0 /100 WBC
PLATELET # BLD AUTO: 312 K/UL (ref 150–350)
PMV BLD AUTO: 10.6 FL (ref 9.2–12.9)
POTASSIUM SERPL-SCNC: 4.7 MMOL/L (ref 3.5–5.1)
PROT SERPL-MCNC: 7 G/DL (ref 6–8.4)
RBC # BLD AUTO: 4.38 M/UL (ref 4–5.4)
SODIUM SERPL-SCNC: 142 MMOL/L (ref 136–145)
TRIGL SERPL-MCNC: 78 MG/DL (ref 30–150)
TSH SERPL DL<=0.005 MIU/L-ACNC: 2.23 UIU/ML (ref 0.4–4)
WBC # BLD AUTO: 6.3 K/UL (ref 3.9–12.7)

## 2019-07-09 PROCEDURE — 80053 COMPREHEN METABOLIC PANEL: CPT

## 2019-07-09 PROCEDURE — 36415 COLL VENOUS BLD VENIPUNCTURE: CPT | Mod: PO

## 2019-07-09 PROCEDURE — 1101F PT FALLS ASSESS-DOCD LE1/YR: CPT | Mod: CPTII,S$GLB,, | Performed by: INTERNAL MEDICINE

## 2019-07-09 PROCEDURE — 82306 VITAMIN D 25 HYDROXY: CPT

## 2019-07-09 PROCEDURE — 99215 PR OFFICE/OUTPT VISIT, EST, LEVL V, 40-54 MIN: ICD-10-PCS | Mod: S$GLB,,, | Performed by: INTERNAL MEDICINE

## 2019-07-09 PROCEDURE — 99999 PR PBB SHADOW E&M-EST. PATIENT-LVL III: ICD-10-PCS | Mod: PBBFAC,,, | Performed by: INTERNAL MEDICINE

## 2019-07-09 PROCEDURE — 85025 COMPLETE CBC W/AUTO DIFF WBC: CPT

## 2019-07-09 PROCEDURE — 84443 ASSAY THYROID STIM HORMONE: CPT

## 2019-07-09 PROCEDURE — 83036 HEMOGLOBIN GLYCOSYLATED A1C: CPT

## 2019-07-09 PROCEDURE — 86803 HEPATITIS C AB TEST: CPT

## 2019-07-09 PROCEDURE — 99215 OFFICE O/P EST HI 40 MIN: CPT | Mod: S$GLB,,, | Performed by: INTERNAL MEDICINE

## 2019-07-09 PROCEDURE — 99999 PR PBB SHADOW E&M-EST. PATIENT-LVL III: CPT | Mod: PBBFAC,,, | Performed by: INTERNAL MEDICINE

## 2019-07-09 PROCEDURE — 80061 LIPID PANEL: CPT

## 2019-07-09 PROCEDURE — 1101F PR PT FALLS ASSESS DOC 0-1 FALLS W/OUT INJ PAST YR: ICD-10-PCS | Mod: CPTII,S$GLB,, | Performed by: INTERNAL MEDICINE

## 2019-07-09 RX ORDER — VALACYCLOVIR HYDROCHLORIDE 1 G/1
TABLET, FILM COATED ORAL
Qty: 30 TABLET | Refills: 12 | Status: SHIPPED | OUTPATIENT
Start: 2019-07-09 | End: 2020-08-05 | Stop reason: SDUPTHER

## 2019-07-09 NOTE — PROGRESS NOTES
Chief complaint  lightheaded, neck pain    64-year-old white female patient reports 1 week of sporadic symptoms.  Slightly dizzy.  It is more when bending over at the  or loading clothes in the [place].  Not that severe.  May be rated between 1 and 5 in intensity.  Slight nausea.  No fluid losses lately.  No new hearing loss or tinnitus.  When I mentioned vertigo she does have in her records that we told her that she had that at 1 time.  She has not skipped any meals.  She drank a Coke once and that seemed to help    She describe some slight shortness of breath but it is actually not dyspnea on exertion just occasionally needing to take an extra breath 30 on and she was reassured about that    Some cramps and spasms in the leg on 4 occasions.  Probably both legs.  Next    Other concern is some bilateral upper trapezius muscle pain. She has in her records that she has had this couple of times over the years.  I reassured her using visual aids trying to describe that there is nothing else they accept the upper trapezius muscle.  We discussed applying heat.  She was concerned because it did not go away in a week but reassured that it takes longer than that for injuries to the muscles to heal.    Very rarely sometimes her heart will lb be fast that for about 30 sec only.  It is not frequent enough to obtain a Holter monitor and she basically is otherwise asymptomatic although it is concerning.  She has not had lab work since 2004.  We will reassess.    She does have some outside bone densities done by her gyn.  She has had 3 of them.  The last 1 was 2012 and it did show some mild osteopenia in the spine and hips.  We will check her vitamin-D and probably repeat a bone density in the future.  Patient obviously very anxious she was counseled at length regarding the multitude of these issuesTotal time over 45 minutes with over 50% counseling.        ROS:   CONST: weight stable. EYES: no vision change. ENT: no sore  throat. CV: no chest pain w/ exertion. RESP: no shortness of breath. GI: no nausea, vomiting, diarrhea. No dysphagia. : no urinary issues. MUSCULOSKELETAL: no new myalgias or arthralgias. SKIN: no new changes. NEURO: no focal deficits. PSYCH: no new issues. ENDOCRINE: no polyuria. HEME: no lymph nodes. ALLERGY: no general pruritis.      Past medical history :  1.  Hypertension ???  2.  Migraines  3.  Fever blisters  4.  ALLERGIES  5.  Scalp dermatitis  6. Normal colonoscopy in past  7. Nl BMD in past per Gyn      Past Surgical History   Procedure Laterality Date    Tonsillectomy, adenoidectomy      Breast biopsy      Oophorectomy      Dilation and curettage of uterus      Vocal cord nodules      Incisional breast biopsy  2004    Hysterectomy  2003     [facility]       Social History     Socioeconomic History    Marital status:      Spouse name: Not on file    Number of children: Not on file    Years of education: Not on file    Highest education level: Not on file   Occupational History    Occupation: Unemployed   Social Needs    Financial resource strain: Not on file    Food insecurity:     Worry: Not on file     Inability: Not on file    Transportation needs:     Medical: Not on file     Non-medical: Not on file   Tobacco Use    Smoking status: Never Smoker    Smokeless tobacco: Never Used   Substance and Sexual Activity    Alcohol use: Yes     Alcohol/week: 0.6 oz     Types: 1 Glasses of wine per week     Comment: occasional    Drug use: No    Sexual activity: Yes     Partners: Male     Birth control/protection: Surgical     Comment:  since 1976   Lifestyle    Physical activity:     Days per week: Not on file     Minutes per session: Not on file    Stress: Not on file   Relationships    Social connections:     Talks on phone: Not on file     Gets together: Not on file     Attends Hoahaoism service: Not on file     Active member of club or organization: Not on file     Attends  meetings of clubs or organizations: Not on file     Relationship status: Not on file   Other Topics Concern    Not on file   Social History Narrative    . First grandchild born 9/2012.     Works at a school.      family history includes Breast cancer in her maternal aunt, maternal aunt, and maternal aunt; Cancer in her brother; Coronary artery disease in her father and mother; Diabetes in her brother; Stroke in her father.      Vital signs as above  Gen: no distress  EYES: conjunctiva clear, non-icteric, PERRL, no nystagmus  ENT: nose clear, nasal mucosa normal, oropharynx clear and moist, teeth good  NECK:supple, thyroid non-palpable  RESP: effort is good, lungs clear  CV: heart RRR w/o murmur, gallops or rubs; no carotid bruits, no edema  GI: abdomen soft, non-distended, non-tender, no hepatosplenomegaly  MS: gait normal, no clubbing or cyanosis of the digits, upper trapezius muscle slightly tender.  SKIN: no rashes, warm to touch      Nan was seen today for dizziness.    Diagnoses and all orders for this visit:    Lightheadedness, very suspicious for vertigo, does not appear to be orthostasis but I did encourage her to get a blood pressure monitor and begin recording blood pressure and pulse.  Do not suspect volume depletion or anemia but will check lab work.  Symptoms are mild and we discussed that suppression with meclizine or Valium would only increase sedation.  She did notice that she was a little bit more sleepy this past week although not using any new medications.  She has no worrisome signs or symptoms to suggest a need for an ENT referral.  We discussed that can sometimes take a week or 2 to go away on its own and she may well have had this before.  -     CBC auto differential; Future  -     TSH; Future  -     Comprehensive metabolic panel; Future  -     Vitamin D; Future  -     Lipid panel; Future  -     Hemoglobin A1c; Future    Strain of trapezius muscle, unspecified laterality, initial  "encounter, apply heat, reassured    Recurrent oral herpes simplex infection, med refilled  -     valACYclovir (VALTREX) 1000 MG tablet; 2 pills the repeat 2 pills 12 hrs later    SOB (shortness of breath), reassured that she does not have true dyspnea on exertion it would require any further workup    Palpitations, rare, doubt any underlying issues such as thyroid but that will be included in labs    Osteopenia, eventual reassessment, check vitamin-D          , Patient does exhibit a significant amount of anxiety regarding the above symptoms in excess to the clinical note would not be therapeutic"/"This note will not be shared with the patient."    "

## 2019-07-11 LAB — HCV AB SERPL QL IA: NEGATIVE

## 2019-07-26 ENCOUNTER — OFFICE VISIT (OUTPATIENT)
Dept: OBSTETRICS AND GYNECOLOGY | Facility: CLINIC | Age: 64
End: 2019-07-26
Payer: COMMERCIAL

## 2019-07-26 VITALS
HEIGHT: 63 IN | WEIGHT: 136.44 LBS | DIASTOLIC BLOOD PRESSURE: 71 MMHG | BODY MASS INDEX: 24.18 KG/M2 | SYSTOLIC BLOOD PRESSURE: 126 MMHG

## 2019-07-26 DIAGNOSIS — Z01.419 ENCOUNTER FOR GYNECOLOGICAL EXAMINATION WITHOUT ABNORMAL FINDING: Primary | ICD-10-CM

## 2019-07-26 DIAGNOSIS — E89.40 POSTSURGICAL MENOPAUSE: ICD-10-CM

## 2019-07-26 DIAGNOSIS — Z12.31 SCREENING MAMMOGRAM, ENCOUNTER FOR: ICD-10-CM

## 2019-07-26 PROCEDURE — 99999 PR PBB SHADOW E&M-EST. PATIENT-LVL III: CPT | Mod: PBBFAC,,, | Performed by: OBSTETRICS & GYNECOLOGY

## 2019-07-26 PROCEDURE — 99397 PER PM REEVAL EST PAT 65+ YR: CPT | Mod: S$GLB,,, | Performed by: OBSTETRICS & GYNECOLOGY

## 2019-07-26 PROCEDURE — 99999 PR PBB SHADOW E&M-EST. PATIENT-LVL III: ICD-10-PCS | Mod: PBBFAC,,, | Performed by: OBSTETRICS & GYNECOLOGY

## 2019-07-26 PROCEDURE — 99397 PR PREVENTIVE VISIT,EST,65 & OVER: ICD-10-PCS | Mod: S$GLB,,, | Performed by: OBSTETRICS & GYNECOLOGY

## 2019-07-26 RX ORDER — ESTRADIOL 0.03 MG/D
FILM, EXTENDED RELEASE TRANSDERMAL
Qty: 8 PATCH | Refills: 12 | Status: SHIPPED | OUTPATIENT
Start: 2019-07-26 | End: 2020-01-09 | Stop reason: SDUPTHER

## 2019-07-26 RX ORDER — ACETAMINOPHEN 500 MG
TABLET ORAL
COMMUNITY
End: 2021-06-16

## 2019-07-26 NOTE — PROGRESS NOTES
Subjective:       Patient ID: aNn Avina is a 64 y.o. female.    Chief Complaint:  Well Woman      History of Present Illness  HPI  Nan Avina is a 64 y.o. female  here for her annual GYN exam.   She reports migraines which occur about 3-4 times/year, and last about 3 days, relieved with NSAIDs. She states they started after her hysterectomy when she was given a hormone patch, they are now less frequent on the smaller patch.   denies vaginal itching or irritation.  Denies vaginal discharge.  She is sexually active. She has had 1 partner for 43 years .   History of abnormal pap: No  Last Pap: patient does not recall when last pap was  Last MMG: normal--routine follow-up in 12 months  Last Colonoscopy:  colonoscopy 10 years ago without abnormalities.  denies domestic violence. She does feel safe at home.     Past Medical History:   Diagnosis Date    Allergic rhinitis, seasonal 2016    Fever blister     Migraine headache     Migraine syndrome 2016    Osteopenia 2019    Polycystic ovarian syndrome      Past Surgical History:   Procedure Laterality Date    BREAST BIOPSY      DILATION AND CURETTAGE OF UTERUS      [facility]    INCISIONAL BREAST BIOPSY  2004    OOPHORECTOMY      TONSILLECTOMY, ADENOIDECTOMY      Vocal cord nodules       Social History     Socioeconomic History    Marital status:      Spouse name: Not on file    Number of children: Not on file    Years of education: Not on file    Highest education level: Not on file   Occupational History    Occupation: Unemployed   Social Needs    Financial resource strain: Not on file    Food insecurity:     Worry: Not on file     Inability: Not on file    Transportation needs:     Medical: Not on file     Non-medical: Not on file   Tobacco Use    Smoking status: Never Smoker    Smokeless tobacco: Never Used   Substance and Sexual Activity    Alcohol use: Yes     Alcohol/week: 0.6  "oz     Types: 1 Glasses of wine per week     Comment: occasional    Drug use: No    Sexual activity: Yes     Partners: Male     Birth control/protection: Surgical     Comment:  since    Lifestyle    Physical activity:     Days per week: Not on file     Minutes per session: Not on file    Stress: Not on file   Relationships    Social connections:     Talks on phone: Not on file     Gets together: Not on file     Attends Congregation service: Not on file     Active member of club or organization: Not on file     Attends meetings of clubs or organizations: Not on file     Relationship status: Not on file   Other Topics Concern    Not on file   Social History Narrative    . First grandchild born 2012.     Works at a school.      Family History   Problem Relation Age of Onset    Cancer Brother         The patient's brother  from cancer at age 58 unknown source of cancer possible liver    Diabetes Brother     Breast cancer Maternal Aunt     Breast cancer Maternal Aunt     Stroke Father     Coronary artery disease Father     Coronary artery disease Mother     Breast cancer Maternal Aunt     Colon cancer Neg Hx     Ovarian cancer Neg Hx     Hypertension Neg Hx      OB History        3    Para   3    Term   3            AB        Living   3       SAB        TAB        Ectopic        Multiple        Live Births   3                 /71   Ht 5' 3" (1.6 m)   Wt 61.9 kg (136 lb 7.4 oz)   BMI 24.17 kg/m²         GYN & OB History    Date of Last Pap: No result found    OB History    Para Term  AB Living   3 3 3     3   SAB TAB Ectopic Multiple Live Births           3      # Outcome Date GA Lbr Hang/2nd Weight Sex Delivery Anes PTL Lv   3 Term      Vag-Spont   ISAI   2 Term      Vag-Spont   ISAI   1 Term      Vag-Spont   ISAI       Review of Systems  Review of Systems   Constitutional: Negative for activity change, appetite change, fatigue and unexpected weight " change.   HENT: Negative.    Eyes: Negative for visual disturbance.   Respiratory: Negative for shortness of breath and wheezing.    Cardiovascular: Negative for chest pain, palpitations and leg swelling.   Gastrointestinal: Negative for abdominal pain, bloating and blood in stool.   Endocrine: Negative for diabetes, hair loss and hot flashes.   Genitourinary: Negative for decreased libido, dyspareunia, hot flashes and vaginal dryness.   Musculoskeletal: Negative for back pain and joint swelling.   Integumentary:  Negative for acne, hair changes and nipple discharge.   Neurological: Positive for headaches.   Hematological: Does not bruise/bleed easily.   Psychiatric/Behavioral: Negative for depression and sleep disturbance. The patient is not nervous/anxious.    Breast: Negative for mastodynia and nipple discharge          Objective:      Physical Exam:   Constitutional: She is oriented to person, place, and time. She appears well-developed and well-nourished.    HENT:   Head: Normocephalic and atraumatic.    Eyes: Pupils are equal, round, and reactive to light. EOM are normal.    Neck: Normal range of motion. Neck supple.    Cardiovascular: Normal rate and regular rhythm.     Pulmonary/Chest: Effort normal and breath sounds normal.   BREASTS:  no mass, no tenderness, no deformity and no retraction. Right breast exhibits no inverted nipple, no mass, no nipple discharge, no skin change, no tenderness, no bleeding and no swelling. Left breast exhibits no inverted nipple, no mass, no nipple discharge, no skin change, no tenderness, no bleeding and no swelling. Breasts are symmetrical.              Abdominal: Soft. Bowel sounds are normal.     Genitourinary: Pelvic exam was performed with patient supine.   Genitourinary Comments: PELVIC: Normal external female genitalia without lesions. Normal hair distribution. Adequate perineal body, normal urethral meatus. Vagina atrophic without lesions or discharge. No significant  cystocele or rectocele. Bimanual exam shows uterus and cervix to be surgically absent. Adnexa without masses or tenderness.  RECTAL: Deferred             Musculoskeletal: Normal range of motion and moves all extremeties.       Neurological: She is alert and oriented to person, place, and time.    Skin: Skin is warm and dry.    Psychiatric: She has a normal mood and affect.              Assessment:        1. Encounter for gynecological examination without abnormal finding    2. Screening mammogram, encounter for    3. Postsurgical menopause                Plan:      1. Encounter for gynecological examination without abnormal finding  COUNSELING:  The patient was counseled today on regular weight bearing exercise. Patient was counseled today on the new ACS guidelines for cervical cytology screening as well as the current recommendations for breast cancer screening. Counseling session lasted approximately 10 minutes, and all her questions were answered. She was advised to see her primary care physician for all other health maintenance.   FOLLOW-UP with me for next routine visit.         2. Screening mammogram, encounter for    Mammogram    3. Postsurgical menopause      - estradiol (VIVELLE-DOT) 0.025 mg/24 hr; Apply twice weekly  Dispense: 8 patch; Refill: 12       Follow up in about 1 year (around 7/26/2020).

## 2019-10-31 DIAGNOSIS — Z12.11 COLON CANCER SCREENING: ICD-10-CM

## 2020-01-06 ENCOUNTER — TELEPHONE (OUTPATIENT)
Dept: OBSTETRICS AND GYNECOLOGY | Facility: CLINIC | Age: 65
End: 2020-01-06

## 2020-01-06 NOTE — TELEPHONE ENCOUNTER
----- Message from Pj Carlson sent at 1/6/2020  4:11 PM CST -----  Contact: patient  Patient would like to get her pharmacy changed. Patient number is [phone]

## 2020-01-09 ENCOUNTER — HOSPITAL ENCOUNTER (OUTPATIENT)
Dept: RADIOLOGY | Facility: HOSPITAL | Age: 65
Discharge: HOME | End: 2020-01-09
Attending: OBSTETRICS & GYNECOLOGY
Payer: COMMERCIAL

## 2020-01-09 DIAGNOSIS — Z12.31 SCREENING MAMMOGRAM, ENCOUNTER FOR: ICD-10-CM

## 2020-01-09 DIAGNOSIS — E89.40 POSTSURGICAL MENOPAUSE: ICD-10-CM

## 2020-01-09 PROCEDURE — 77063 BREAST TOMOSYNTHESIS BI: CPT | Mod: 26,,, | Performed by: RADIOLOGY

## 2020-01-09 PROCEDURE — 77067 SCR MAMMO BI INCL CAD: CPT | Mod: TC,PO

## 2020-01-09 PROCEDURE — 77067 MAMMO DIGITAL SCREENING BILAT WITH TOMOSYNTHESIS_CAD: ICD-10-PCS | Mod: 26,,, | Performed by: RADIOLOGY

## 2020-01-09 PROCEDURE — 77067 SCR MAMMO BI INCL CAD: CPT | Mod: 26,,, | Performed by: RADIOLOGY

## 2020-01-09 PROCEDURE — 77063 MAMMO DIGITAL SCREENING BILAT WITH TOMOSYNTHESIS_CAD: ICD-10-PCS | Mod: 26,,, | Performed by: RADIOLOGY

## 2020-01-09 RX ORDER — ESTRADIOL 0.03 MG/D
FILM, EXTENDED RELEASE TRANSDERMAL
Qty: 8 PATCH | Refills: 6 | Status: SHIPPED | OUTPATIENT
Start: 2020-01-09 | End: 2020-02-05 | Stop reason: SDUPTHER

## 2020-01-09 NOTE — TELEPHONE ENCOUNTER
----- Message from Magda Lockhart sent at 1/9/2020  3:22 PM CST -----  Contact: Self      Name of Who is Calling: MARIO RUSSELL [[id number]]      What is the request in detail: Pt would to speak with staff about changing her pharmacy:  [phone] [facility] [address] [place] [address]      Can the clinic reply by [facility]: Y      What Number to Call Back if not in [facility]:  [phone]

## 2020-02-05 ENCOUNTER — TELEPHONE (OUTPATIENT)
Dept: OBSTETRICS AND GYNECOLOGY | Facility: CLINIC | Age: 65
End: 2020-02-05

## 2020-02-05 DIAGNOSIS — E89.40 POSTSURGICAL MENOPAUSE: ICD-10-CM

## 2020-02-05 RX ORDER — ESTRADIOL 0.03 MG/D
FILM, EXTENDED RELEASE TRANSDERMAL
Qty: 8 PATCH | Refills: 6 | Status: SHIPPED | OUTPATIENT
Start: 2020-02-05 | End: 2020-08-03

## 2020-02-05 NOTE — TELEPHONE ENCOUNTER
"----- Message from Elysia Zazueta LPN sent at 2/5/2020  3:00 PM CST -----  Contact: MARIO RUSSELL [[id number]]      ----- Message -----  From: Krystle Mathews  Sent: 2/5/2020   2:38 PM CST  To: Sanch[facility] STEELE Staff    Name of Who is Calling: MARIO RUSSELL [[id number]]      What is the request in detail:   Patient called stating, "she's returning your call and would like you to please call again."     Please do so at your earliest convenience and further advise.  THANKS!       Reply by [facility]: YES      Call Back: MARIO RUSSELL // #  [phone] (H)                                      "

## 2020-02-05 NOTE — TELEPHONE ENCOUNTER
----- Message from Myra Swenson sent at 2/5/2020  2:41 PM CST -----  Contact: MARIO RUSSELL [[id number]]  Type: RX Refill Request    Who Called: MARIO RUSSELL [[id number]]    RX Name and Strength: estradiol (VIVELLE-DOT) 0.025 mg/24 hr    Preferred Pharmacy with phone number: [facility] [address] [place] [address]  [phone]    Best Call Back Number:[phone]    Additional Information: She states that medication went to the incorrect pharmacy.

## 2020-04-06 ENCOUNTER — PATIENT MESSAGE (OUTPATIENT)
Dept: ADMINISTRATIVE | Facility: OTHER | Age: 65
End: 2020-04-06

## 2020-05-13 ENCOUNTER — PATIENT OUTREACH (OUTPATIENT)
Dept: ADMINISTRATIVE | Facility: HOSPITAL | Age: 65
End: 2020-05-13

## 2020-08-05 DIAGNOSIS — B00.89 RECURRENT ORAL HERPES SIMPLEX INFECTION: ICD-10-CM

## 2020-08-05 RX ORDER — VALACYCLOVIR HYDROCHLORIDE 1 G/1
TABLET, FILM COATED ORAL
Qty: 30 TABLET | Refills: 12 | Status: SHIPPED | OUTPATIENT
Start: 2020-08-05 | End: 2023-12-29 | Stop reason: SDUPTHER

## 2020-08-05 NOTE — TELEPHONE ENCOUNTER
----- Message from Tara Styles sent at 8/5/2020  3:45 PM CDT -----  Type: Patient Call Back    Who called: pt     What is the request in detail: pt asking for a call back regarding prescription. Pt declined to leave additional details.     Can the clinic reply by [facility]? No     Would the patient rather a call back or a response via [facility]? Call back     Best call back number: [phone]    Additional Information:

## 2020-08-10 ENCOUNTER — PATIENT OUTREACH (OUTPATIENT)
Dept: ADMINISTRATIVE | Facility: HOSPITAL | Age: 65
End: 2020-08-10

## 2020-08-10 DIAGNOSIS — Z12.11 COLON CANCER SCREENING: Primary | ICD-10-CM

## 2020-11-09 ENCOUNTER — PATIENT OUTREACH (OUTPATIENT)
Dept: ADMINISTRATIVE | Facility: HOSPITAL | Age: 65
End: 2020-11-09

## 2021-02-12 DIAGNOSIS — E89.40 POSTSURGICAL MENOPAUSE: ICD-10-CM

## 2021-02-15 RX ORDER — ESTRADIOL 0.03 MG/D
FILM, EXTENDED RELEASE TRANSDERMAL
Qty: 8 PATCH | Refills: 1 | Status: SHIPPED | OUTPATIENT
Start: 2021-02-15 | End: 2021-02-17 | Stop reason: SDUPTHER

## 2021-02-17 ENCOUNTER — PATIENT MESSAGE (OUTPATIENT)
Dept: OBSTETRICS AND GYNECOLOGY | Facility: CLINIC | Age: 66
End: 2021-02-17

## 2021-02-17 DIAGNOSIS — E89.40 POSTSURGICAL MENOPAUSE: ICD-10-CM

## 2021-02-17 RX ORDER — ESTRADIOL 0.03 MG/D
FILM, EXTENDED RELEASE TRANSDERMAL
Qty: 8 PATCH | Refills: 1 | Status: SHIPPED | OUTPATIENT
Start: 2021-02-17 | End: 2021-04-13 | Stop reason: SDUPTHER

## 2021-04-12 ENCOUNTER — PATIENT MESSAGE (OUTPATIENT)
Dept: ADMINISTRATIVE | Facility: HOSPITAL | Age: 66
End: 2021-04-12

## 2021-04-19 NOTE — TELEPHONE ENCOUNTER
----- Message from Magda Lockhart sent at 1/9/2020  3:25 PM CST -----  Contact: Self      Can the clinic reply in [facility]:Y        Please refill the medication(s) listed below. Please call the patient when the prescription(s) is ready for  at this phone number         Medication #1 estradiol (VIVELLE-DOT) 0.025 mg/24 hr    Medication #2       Preferred Pharmacy: [id number] [address] [place] [address]       [[email or web address]] : Dear Dr. Casi Abdullahi\par \par Thank you so much for the referral to help care for your patient.\par \par Chief Complaint: BPH with [place]\par Date of first visit: 09/08/2020\par \par RADHA HOANG  is a 75 year old  man with Hx of BPH with elevated PSA and prior negative prostate biopsies. He was switched to Uroxatral from 0.4mg Flomax 1 month ago due to worsening symptoms. Biggest complaint is still weak FOS. He does not think he has seen much of a difference since changing medications however his IPSS score has improved. Denies dizziness or any negative side effects.\par \par Also complaining of ED. He has no cardiac history and reports he still has weak morning erections. Started Viagra 1 month ago. Sees some improvement with this. \par \par PSA Hx:\par 2.65 06/2019\par 5.4 02/2018\par 2.78  09/2016\par \par PCA3: 62 in 2016, 59 in 2019. 4K 1%\par Flow 09/08/20: Peak 13.7 ml/s, Ave 6.1 ml/s,  mL. PVR 4 mL.\par Flow 3/9/21: Peak 11.3 ml/s, avg 5.5 ml/s, vv 69cc. bell shaped curve. PVR 0mL\par Flow 4/19/21: Peak 14.6ml/s, avg 6.7 ml/s, vv 86mL. PVR 8 mL\par \par 4/19/2021\par IPSS 5 QOL 3\par LIOR 2\par \par 3/9/2021 \par IPSS 8  QOL 2\par LIOR 2\par \par 09/06/2020 \par IPSS 3 QOL 3 \par LIOR 1\par \par The patient denies fevers, chills, nausea and or vomiting and no unexplained weight loss. No UTIs or prostatitis.\par \par All pertinent laboratory, films and physician notes were reviewed.  Questionnaire results were discussed with patient.

## 2021-05-11 DIAGNOSIS — E89.40 POSTSURGICAL MENOPAUSE: ICD-10-CM

## 2021-05-11 RX ORDER — ESTRADIOL 0.03 MG/D
FILM, EXTENDED RELEASE TRANSDERMAL
Qty: 8 PATCH | Refills: 1 | Status: SHIPPED | OUTPATIENT
Start: 2021-05-11 | End: 2021-06-16 | Stop reason: SDUPTHER

## 2021-05-14 ENCOUNTER — TELEPHONE (OUTPATIENT)
Dept: OBSTETRICS AND GYNECOLOGY | Facility: CLINIC | Age: 66
End: 2021-05-14

## 2021-05-14 DIAGNOSIS — E89.40 POSTSURGICAL MENOPAUSE: ICD-10-CM

## 2021-06-16 ENCOUNTER — OFFICE VISIT (OUTPATIENT)
Dept: OBSTETRICS AND GYNECOLOGY | Facility: CLINIC | Age: 66
End: 2021-06-16
Payer: COMMERCIAL

## 2021-06-16 VITALS
DIASTOLIC BLOOD PRESSURE: 80 MMHG | BODY MASS INDEX: 23.99 KG/M2 | SYSTOLIC BLOOD PRESSURE: 124 MMHG | WEIGHT: 135.38 LBS | HEIGHT: 63 IN

## 2021-06-16 DIAGNOSIS — Z01.419 ENCOUNTER FOR GYNECOLOGICAL EXAMINATION WITHOUT ABNORMAL FINDING: Primary | ICD-10-CM

## 2021-06-16 DIAGNOSIS — E89.40 POSTSURGICAL MENOPAUSE: ICD-10-CM

## 2021-06-16 DIAGNOSIS — Z12.31 SCREENING MAMMOGRAM, ENCOUNTER FOR: ICD-10-CM

## 2021-06-16 PROCEDURE — 99397 PER PM REEVAL EST PAT 65+ YR: CPT | Mod: S$GLB,,, | Performed by: OBSTETRICS & GYNECOLOGY

## 2021-06-16 PROCEDURE — 3288F FALL RISK ASSESSMENT DOCD: CPT | Mod: CPTII,S$GLB,, | Performed by: OBSTETRICS & GYNECOLOGY

## 2021-06-16 PROCEDURE — 99999 PR PBB SHADOW E&M-EST. PATIENT-LVL III: CPT | Mod: PBBFAC,,, | Performed by: OBSTETRICS & GYNECOLOGY

## 2021-06-16 PROCEDURE — 1126F AMNT PAIN NOTED NONE PRSNT: CPT | Mod: S$GLB,,, | Performed by: OBSTETRICS & GYNECOLOGY

## 2021-06-16 PROCEDURE — 99999 PR PBB SHADOW E&M-EST. PATIENT-LVL III: ICD-10-PCS | Mod: PBBFAC,,, | Performed by: OBSTETRICS & GYNECOLOGY

## 2021-06-16 PROCEDURE — 3008F BODY MASS INDEX DOCD: CPT | Mod: CPTII,S$GLB,, | Performed by: OBSTETRICS & GYNECOLOGY

## 2021-06-16 PROCEDURE — 3008F PR BODY MASS INDEX (BMI) DOCUMENTED: ICD-10-PCS | Mod: CPTII,S$GLB,, | Performed by: OBSTETRICS & GYNECOLOGY

## 2021-06-16 PROCEDURE — 1101F PT FALLS ASSESS-DOCD LE1/YR: CPT | Mod: CPTII,S$GLB,, | Performed by: OBSTETRICS & GYNECOLOGY

## 2021-06-16 PROCEDURE — 1101F PR PT FALLS ASSESS DOC 0-1 FALLS W/OUT INJ PAST YR: ICD-10-PCS | Mod: CPTII,S$GLB,, | Performed by: OBSTETRICS & GYNECOLOGY

## 2021-06-16 PROCEDURE — 3288F PR FALLS RISK ASSESSMENT DOCUMENTED: ICD-10-PCS | Mod: CPTII,S$GLB,, | Performed by: OBSTETRICS & GYNECOLOGY

## 2021-06-16 PROCEDURE — 1126F PR PAIN SEVERITY QUANTIFIED, NO PAIN PRESENT: ICD-10-PCS | Mod: S$GLB,,, | Performed by: OBSTETRICS & GYNECOLOGY

## 2021-06-16 PROCEDURE — 99397 PR PREVENTIVE VISIT,EST,65 & OVER: ICD-10-PCS | Mod: S$GLB,,, | Performed by: OBSTETRICS & GYNECOLOGY

## 2021-06-16 RX ORDER — ESTRADIOL 0.03 MG/D
FILM, EXTENDED RELEASE TRANSDERMAL
Qty: 8 PATCH | Refills: 12 | Status: SHIPPED | OUTPATIENT
Start: 2021-06-16 | End: 2021-07-12

## 2021-09-09 ENCOUNTER — PATIENT MESSAGE (OUTPATIENT)
Dept: OBSTETRICS AND GYNECOLOGY | Facility: CLINIC | Age: 66
End: 2021-09-09

## 2021-09-09 ENCOUNTER — TELEPHONE (OUTPATIENT)
Dept: OBSTETRICS AND GYNECOLOGY | Facility: CLINIC | Age: 66
End: 2021-09-09

## 2021-09-10 ENCOUNTER — TELEPHONE (OUTPATIENT)
Dept: OBSTETRICS AND GYNECOLOGY | Facility: CLINIC | Age: 66
End: 2021-09-10

## 2022-03-03 ENCOUNTER — HOSPITAL ENCOUNTER (OUTPATIENT)
Dept: RADIOLOGY | Facility: HOSPITAL | Age: 67
Discharge: HOME | End: 2022-03-03
Attending: OBSTETRICS & GYNECOLOGY
Payer: COMMERCIAL

## 2022-03-03 DIAGNOSIS — Z12.31 SCREENING MAMMOGRAM, ENCOUNTER FOR: ICD-10-CM

## 2022-03-03 PROCEDURE — 77067 MAMMO DIGITAL SCREENING BILAT WITH TOMO: ICD-10-PCS | Mod: 26,,, | Performed by: RADIOLOGY

## 2022-03-03 PROCEDURE — 77067 SCR MAMMO BI INCL CAD: CPT | Mod: 26,,, | Performed by: RADIOLOGY

## 2022-03-03 PROCEDURE — 77063 MAMMO DIGITAL SCREENING BILAT WITH TOMO: ICD-10-PCS | Mod: 26,,, | Performed by: RADIOLOGY

## 2022-03-03 PROCEDURE — 77063 BREAST TOMOSYNTHESIS BI: CPT | Mod: TC,PO

## 2022-03-03 PROCEDURE — 77067 SCR MAMMO BI INCL CAD: CPT | Mod: TC,PO

## 2022-03-03 PROCEDURE — 77063 BREAST TOMOSYNTHESIS BI: CPT | Mod: 26,,, | Performed by: RADIOLOGY

## 2022-03-24 ENCOUNTER — OFFICE VISIT (OUTPATIENT)
Dept: FAMILY MEDICINE | Facility: CLINIC | Age: 67
End: 2022-03-24
Payer: COMMERCIAL

## 2022-03-24 VITALS
OXYGEN SATURATION: 99 % | WEIGHT: 133.63 LBS | SYSTOLIC BLOOD PRESSURE: 128 MMHG | DIASTOLIC BLOOD PRESSURE: 64 MMHG | HEART RATE: 71 BPM | BODY MASS INDEX: 23.68 KG/M2 | TEMPERATURE: 98 F | HEIGHT: 63 IN

## 2022-03-24 DIAGNOSIS — K21.9 GASTROESOPHAGEAL REFLUX DISEASE, UNSPECIFIED WHETHER ESOPHAGITIS PRESENT: ICD-10-CM

## 2022-03-24 DIAGNOSIS — M79.605 LEFT LEG PAIN: ICD-10-CM

## 2022-03-24 DIAGNOSIS — R79.89 ABNORMAL LIVER FUNCTION TESTS: ICD-10-CM

## 2022-03-24 DIAGNOSIS — R42 DIZZINESS: ICD-10-CM

## 2022-03-24 DIAGNOSIS — Z12.11 SCREENING FOR COLORECTAL CANCER: ICD-10-CM

## 2022-03-24 DIAGNOSIS — Z12.12 SCREENING FOR COLORECTAL CANCER: ICD-10-CM

## 2022-03-24 DIAGNOSIS — Z12.31 ENCOUNTER FOR SCREENING MAMMOGRAM FOR BREAST CANCER: ICD-10-CM

## 2022-03-24 DIAGNOSIS — M85.80 OSTEOPENIA, UNSPECIFIED LOCATION: ICD-10-CM

## 2022-03-24 DIAGNOSIS — I83.93 VARICOSE VEINS OF BOTH LOWER EXTREMITIES, UNSPECIFIED WHETHER COMPLICATED: ICD-10-CM

## 2022-03-24 DIAGNOSIS — Z00.00 ROUTINE MEDICAL EXAM: Primary | ICD-10-CM

## 2022-03-24 DIAGNOSIS — E55.9 VITAMIN D DEFICIENCY DISEASE: ICD-10-CM

## 2022-03-24 DIAGNOSIS — D75.89 MACROCYTIC: ICD-10-CM

## 2022-03-24 DIAGNOSIS — R14.2 BELCHING: ICD-10-CM

## 2022-03-24 PROCEDURE — 99999 PR PBB SHADOW E&M-EST. PATIENT-LVL III: CPT | Mod: PBBFAC,,, | Performed by: INTERNAL MEDICINE

## 2022-03-24 PROCEDURE — 3074F SYST BP LT 130 MM HG: CPT | Mod: CPTII,S$GLB,, | Performed by: INTERNAL MEDICINE

## 2022-03-24 PROCEDURE — 99397 PER PM REEVAL EST PAT 65+ YR: CPT | Mod: S$GLB,,, | Performed by: INTERNAL MEDICINE

## 2022-03-24 PROCEDURE — 3008F PR BODY MASS INDEX (BMI) DOCUMENTED: ICD-10-PCS | Mod: CPTII,S$GLB,, | Performed by: INTERNAL MEDICINE

## 2022-03-24 PROCEDURE — 3078F DIAST BP <80 MM HG: CPT | Mod: CPTII,S$GLB,, | Performed by: INTERNAL MEDICINE

## 2022-03-24 PROCEDURE — 1159F MED LIST DOCD IN RCRD: CPT | Mod: CPTII,S$GLB,, | Performed by: INTERNAL MEDICINE

## 2022-03-24 PROCEDURE — 99397 PR PREVENTIVE VISIT,EST,65 & OVER: ICD-10-PCS | Mod: S$GLB,,, | Performed by: INTERNAL MEDICINE

## 2022-03-24 PROCEDURE — 1101F PT FALLS ASSESS-DOCD LE1/YR: CPT | Mod: CPTII,S$GLB,, | Performed by: INTERNAL MEDICINE

## 2022-03-24 PROCEDURE — 1159F PR MEDICATION LIST DOCUMENTED IN MEDICAL RECORD: ICD-10-PCS | Mod: CPTII,S$GLB,, | Performed by: INTERNAL MEDICINE

## 2022-03-24 PROCEDURE — 3078F PR MOST RECENT DIASTOLIC BLOOD PRESSURE < 80 MM HG: ICD-10-PCS | Mod: CPTII,S$GLB,, | Performed by: INTERNAL MEDICINE

## 2022-03-24 PROCEDURE — 3288F PR FALLS RISK ASSESSMENT DOCUMENTED: ICD-10-PCS | Mod: CPTII,S$GLB,, | Performed by: INTERNAL MEDICINE

## 2022-03-24 PROCEDURE — 3008F BODY MASS INDEX DOCD: CPT | Mod: CPTII,S$GLB,, | Performed by: INTERNAL MEDICINE

## 2022-03-24 PROCEDURE — 1101F PR PT FALLS ASSESS DOC 0-1 FALLS W/OUT INJ PAST YR: ICD-10-PCS | Mod: CPTII,S$GLB,, | Performed by: INTERNAL MEDICINE

## 2022-03-24 PROCEDURE — 3288F FALL RISK ASSESSMENT DOCD: CPT | Mod: CPTII,S$GLB,, | Performed by: INTERNAL MEDICINE

## 2022-03-24 PROCEDURE — 99999 PR PBB SHADOW E&M-EST. PATIENT-LVL III: ICD-10-PCS | Mod: PBBFAC,,, | Performed by: INTERNAL MEDICINE

## 2022-03-24 PROCEDURE — 3074F PR MOST RECENT SYSTOLIC BLOOD PRESSURE < 130 MM HG: ICD-10-PCS | Mod: CPTII,S$GLB,, | Performed by: INTERNAL MEDICINE

## 2022-03-24 RX ORDER — OMEPRAZOLE 40 MG/1
40 CAPSULE, DELAYED RELEASE ORAL DAILY
Qty: 30 CAPSULE | Refills: 12 | Status: SHIPPED | OUTPATIENT
Start: 2022-03-24 | End: 2023-06-24

## 2022-03-24 NOTE — PROGRESS NOTES
Chief complaint  physical exam and other issues    67-year-old white female last seen 2019. mammo 3/22.  Again discussed colon cancer screening.  She has a colo guard just did not want to do appear to explain how easy it is in some other masses by which he can make it easier but they really is not much excuse to do it if not wanting to pursue a colonoscopy.  I will provide her with a new sample kit    She does have some outside bone densities done by her gyn.  She has had 3 of them.  The last 1 was 2012 and it did show some mild osteopenia in the spine and hips.      Lots of stress caring for her mom.  She is the only caregiver.  We had a long conversation regarding how she could be cared for if something was to happen to her.  She had verbalized a desire to be in a nursing home where her parents were.  With the COVID pandemic after her stroke the daughter has been her primary caregiver and wanted to avoid a nursing home.  We discussed looking into the nursing home option again in case it is needed..  Her  recently had six cardiac bypass in November and that caused some increased stress    Less headaches since her gyn is reducing her the dose of her hormone replacement patch    For one month she had uncontrollable belching every night a couple of hours after she went to sleep she would awake with it.  She had some shortness of breath and her chest with it and explained that was probably her version of reflux and heartburn.  She would use Tums and it will be better.  She was set up and it would be better.  She only used her omeprazole when she was taking Advil I discussed and reassured she can use it all the time and I will likely help the situation.    Also some pain and the left inner distal thigh.  Does not appear to be the hamstrings and all but soft tissues and she has a lot of varicose veins.  No inflammatory changes to suggest infection or DVT.  She does have a lot of pain in the left lower leg at the  end of the day and at other times.  Nothing to suggest sciatica.  We will obtain venous ultrasound to assess if indeed this is not varicose veins and venous reflux.    Some lightheadedness on getting up and actually sounds like it is more of a sensation of off-balance just for a few seconds.  She will continue to monitor blood pressure and assessed by moving her head and multiple directions while sitting to assess if she can't reproduce it.  Sounds like some mild equilibrium issues.  Does not sound like orthostasis.        ROS:   CONST: weight stable. EYES: no vision change. ENT: no sore throat. CV: no chest pain w/ exertion. RESP: no shortness of breath. GI: no nausea, vomiting, diarrhea. No dysphagia. : no urinary issues. MUSCULOSKELETAL: no new myalgias or arthralgias. SKIN: no new changes. NEURO: no focal deficits. PSYCH: no new issues. ENDOCRINE: no polyuria. HEME: no lymph nodes. ALLERGY: no general pruritis.      Past medical history :  1.  Hypertension ???  2.  Migraines  3.  Fever blisters  4.  ALLERGIES  5.  Scalp dermatitis  6. Normal colonoscopy in past  7. Nl BMD in past per Gyn      Past Surgical History   Procedure Laterality Date    Tonsillectomy, adenoidectomy      Breast biopsy      Oophorectomy      Dilation and curettage of uterus      Vocal cord nodules      Incisional breast biopsy  2004    Hysterectomy  2003     [facility]       Social History     Socioeconomic History    Marital status:    Occupational History    Occupation: Unemployed   Tobacco Use    Smoking status: Never Smoker    Smokeless tobacco: Never Used   Substance and Sexual Activity    Alcohol use: Yes     Alcohol/week: 1.0 standard drink     Types: 1 Glasses of wine per week     Comment: occasional    Drug use: No    Sexual activity: Yes     Partners: Male     Birth control/protection: Surgical     Comment:  since 1976   Social History Narrative    . First grandchild born 9/2012.     Works at a  school.      family history includes Breast cancer in her maternal aunt, maternal aunt, and maternal aunt; Cancer in her brother; Coronary artery disease in her father and mother; Diabetes in her brother; Stroke in her father and mother.      Vital signs as above  Gen: no distress  EYES: conjunctiva clear, non-icteric, PERRL, no nystagmus  ENT: nose clear, nasal mucosa normal, oropharynx clear and moist, teeth good  NECK:supple, thyroid non-palpable  RESP: effort is good, lungs clear  CV: heart RRR w/o murmur, gallops or rubs; no carotid bruits, no edema  GI: abdomen soft, non-distended, non-tender, no hepatosplenomegaly  MS: gait normal, no clubbing or cyanosis of the digits, tender soft tissues on the left inner thigh but the hamstring tendons are nontender to touch and to resisted strength testing.  No effusions of the joints.  SKIN: no rashes, warm to touch    Nan was seen today for annual exam.    Diagnoses and all orders for this visit:    Routine medical exam , up-to-date on mammogram, overdue for some form of colon screening lab work  -     Hemoglobin A1C; Future  -     Comprehensive Metabolic Panel; Future  -     Vitamin D; Future  -     TSH; Future  -     Lipid Panel; Future  -     CBC Auto Differential; Future  -     Vitamin B12; Future  -     Hepatitis C Antibody; Future  -     Hepatitis B Surface Antigen; Future    Encounter for screening mammogram for breast cancer    Screening for colorectal cancer  -     Cologuard Screening (Multitarget Stool DNA); Future  -     Cologuard Screening (Multitarget Stool DNA)    Vitamin D deficiency disease, reassess  -     Vitamin D; Future    Gastroesophageal reflux disease, unspecified whether esophagitis present, restart omeprazole on a daily basis    Abnormal liver function tests, reassess and workup  -     Hemoglobin A1C; Future  -     Comprehensive Metabolic Panel; Future  -     Hepatitis C Antibody; Future  -     Hepatitis B Surface Antigen;  Future    Macrocytic, workup  -     TSH; Future  -     CBC Auto Differential; Future  -     Vitamin B12; Future    Osteopenia, unspecified location , eventual bone density, difficulty the patient again await further testing at this time  -     Vitamin D; Future    Belching, assess response to PPI    Dizziness suspect mild equilibrium issues, not persistent or severe enough to treat, continue to watch for orthostasis    Varicose veins of both lower extremities, unspecified whether complicated, suspect some varicose vein issues  -     VAS US Venous Legs Bilateral; Future    Left leg pain, possibly vascular, venous  -     VAS US Venous Legs Bilateral; Future    Other orders  -     omeprazole (PRILOSEC) 40 MG capsule; Take 1 capsule (40 mg total) by mouth once daily.

## 2022-03-30 ENCOUNTER — PATIENT MESSAGE (OUTPATIENT)
Dept: FAMILY MEDICINE | Facility: CLINIC | Age: 67
End: 2022-03-30
Payer: COMMERCIAL

## 2022-04-02 ENCOUNTER — LAB VISIT (OUTPATIENT)
Dept: LAB | Facility: HOSPITAL | Age: 67
End: 2022-04-02
Attending: INTERNAL MEDICINE
Payer: COMMERCIAL

## 2022-04-02 DIAGNOSIS — E55.9 VITAMIN D DEFICIENCY DISEASE: ICD-10-CM

## 2022-04-02 DIAGNOSIS — M85.80 OSTEOPENIA, UNSPECIFIED LOCATION: ICD-10-CM

## 2022-04-02 DIAGNOSIS — D75.89 MACROCYTIC: ICD-10-CM

## 2022-04-02 DIAGNOSIS — R79.89 ABNORMAL LIVER FUNCTION TESTS: ICD-10-CM

## 2022-04-02 DIAGNOSIS — Z00.00 ROUTINE MEDICAL EXAM: ICD-10-CM

## 2022-04-02 LAB
ALBUMIN SERPL BCP-MCNC: 3.9 G/DL (ref 3.5–5.2)
ALP SERPL-CCNC: 71 U/L (ref 55–135)
ALT SERPL W/O P-5'-P-CCNC: 31 U/L (ref 10–44)
ANION GAP SERPL CALC-SCNC: 5 MMOL/L (ref 8–16)
AST SERPL-CCNC: 26 U/L (ref 10–40)
BASOPHILS # BLD AUTO: 0.03 K/UL (ref 0–0.2)
BASOPHILS NFR BLD: 0.5 % (ref 0–1.9)
BILIRUB SERPL-MCNC: 0.5 MG/DL (ref 0.1–1)
BUN SERPL-MCNC: 21 MG/DL (ref 8–23)
CALCIUM SERPL-MCNC: 8.9 MG/DL (ref 8.7–10.5)
CHLORIDE SERPL-SCNC: 106 MMOL/L (ref 95–110)
CHOLEST SERPL-MCNC: 205 MG/DL (ref 120–199)
CHOLEST/HDLC SERPL: 3.9 {RATIO} (ref 2–5)
CO2 SERPL-SCNC: 28 MMOL/L (ref 23–29)
CREAT SERPL-MCNC: 0.7 MG/DL (ref 0.5–1.4)
DIFFERENTIAL METHOD: ABNORMAL
EOSINOPHIL # BLD AUTO: 0.1 K/UL (ref 0–0.5)
EOSINOPHIL NFR BLD: 2.1 % (ref 0–8)
ERYTHROCYTE [DISTWIDTH] IN BLOOD BY AUTOMATED COUNT: 12.8 % (ref 11.5–14.5)
EST. GFR  (AFRICAN AMERICAN): >60 ML/MIN/1.73 M^2
EST. GFR  (NON AFRICAN AMERICAN): >60 ML/MIN/1.73 M^2
GLUCOSE SERPL-MCNC: 98 MG/DL (ref 70–110)
HCT VFR BLD AUTO: 43.9 % (ref 37–48.5)
HDLC SERPL-MCNC: 52 MG/DL (ref 40–75)
HDLC SERPL: 25.4 % (ref 20–50)
HGB BLD-MCNC: 14.6 G/DL (ref 12–16)
IMM GRANULOCYTES # BLD AUTO: 0.01 K/UL (ref 0–0.04)
IMM GRANULOCYTES NFR BLD AUTO: 0.2 % (ref 0–0.5)
LDLC SERPL CALC-MCNC: 136.2 MG/DL (ref 63–159)
LYMPHOCYTES # BLD AUTO: 1.3 K/UL (ref 1–4.8)
LYMPHOCYTES NFR BLD: 22.9 % (ref 18–48)
MCH RBC QN AUTO: 32.4 PG (ref 27–31)
MCHC RBC AUTO-ENTMCNC: 33.3 G/DL (ref 32–36)
MCV RBC AUTO: 98 FL (ref 82–98)
MONOCYTES # BLD AUTO: 0.6 K/UL (ref 0.3–1)
MONOCYTES NFR BLD: 9.5 % (ref 4–15)
NEUTROPHILS # BLD AUTO: 3.8 K/UL (ref 1.8–7.7)
NEUTROPHILS NFR BLD: 64.8 % (ref 38–73)
NONHDLC SERPL-MCNC: 153 MG/DL
NRBC BLD-RTO: 0 /100 WBC
PLATELET # BLD AUTO: 315 K/UL (ref 150–450)
PMV BLD AUTO: 9.8 FL (ref 9.2–12.9)
POTASSIUM SERPL-SCNC: 4.1 MMOL/L (ref 3.5–5.1)
PROT SERPL-MCNC: 7.1 G/DL (ref 6–8.4)
RBC # BLD AUTO: 4.5 M/UL (ref 4–5.4)
SODIUM SERPL-SCNC: 139 MMOL/L (ref 136–145)
TRIGL SERPL-MCNC: 84 MG/DL (ref 30–150)
TSH SERPL DL<=0.005 MIU/L-ACNC: 1.91 UIU/ML (ref 0.4–4)
WBC # BLD AUTO: 5.81 K/UL (ref 3.9–12.7)

## 2022-04-02 PROCEDURE — 80061 LIPID PANEL: CPT | Performed by: INTERNAL MEDICINE

## 2022-04-02 PROCEDURE — 80053 COMPREHEN METABOLIC PANEL: CPT | Performed by: INTERNAL MEDICINE

## 2022-04-02 PROCEDURE — 85025 COMPLETE CBC W/AUTO DIFF WBC: CPT | Performed by: INTERNAL MEDICINE

## 2022-04-02 PROCEDURE — 36415 COLL VENOUS BLD VENIPUNCTURE: CPT | Performed by: INTERNAL MEDICINE

## 2022-04-02 PROCEDURE — 84443 ASSAY THYROID STIM HORMONE: CPT | Performed by: INTERNAL MEDICINE

## 2022-04-02 PROCEDURE — 86803 HEPATITIS C AB TEST: CPT | Performed by: INTERNAL MEDICINE

## 2022-04-02 PROCEDURE — 82607 VITAMIN B-12: CPT | Performed by: INTERNAL MEDICINE

## 2022-04-02 PROCEDURE — 83036 HEMOGLOBIN GLYCOSYLATED A1C: CPT | Performed by: INTERNAL MEDICINE

## 2022-04-02 PROCEDURE — 82306 VITAMIN D 25 HYDROXY: CPT | Performed by: INTERNAL MEDICINE

## 2022-04-02 PROCEDURE — 87340 HEPATITIS B SURFACE AG IA: CPT | Performed by: INTERNAL MEDICINE

## 2022-04-03 LAB
25(OH)D3+25(OH)D2 SERPL-MCNC: 17 NG/ML (ref 30–96)
ESTIMATED AVG GLUCOSE: 105 MG/DL (ref 68–131)
HBA1C MFR BLD: 5.3 % (ref 4–5.6)
VIT B12 SERPL-MCNC: 345 PG/ML (ref 210–950)

## 2022-04-04 LAB
HBV SURFACE AG SERPL QL IA: NEGATIVE
HCV AB SERPL QL IA: NEGATIVE

## 2022-04-05 DIAGNOSIS — I83.90 VARICOSE VEINS OF LOWER EXTREMITY: ICD-10-CM

## 2022-04-05 DIAGNOSIS — M79.605 LEFT LEG PAIN: Primary | ICD-10-CM

## 2022-04-07 ENCOUNTER — HOSPITAL ENCOUNTER (OUTPATIENT)
Dept: RADIOLOGY | Facility: HOSPITAL | Age: 67
Discharge: HOME | End: 2022-04-07
Payer: COMMERCIAL

## 2022-04-07 DIAGNOSIS — M79.605 LEFT LEG PAIN: ICD-10-CM

## 2022-04-07 DIAGNOSIS — I83.90 VARICOSE VEINS OF LOWER EXTREMITY: ICD-10-CM

## 2022-04-07 PROCEDURE — 93970 EXTREMITY STUDY: CPT | Mod: 26,LT,, | Performed by: RADIOLOGY

## 2022-04-07 PROCEDURE — 93970 US LOWER EXTREMITY VENOUS INSUFFICIENCY LEFT: ICD-10-PCS | Mod: 26,LT,, | Performed by: RADIOLOGY

## 2022-04-07 PROCEDURE — 93970 EXTREMITY STUDY: CPT | Mod: TC,LT

## 2022-05-12 ENCOUNTER — PATIENT MESSAGE (OUTPATIENT)
Dept: FAMILY MEDICINE | Facility: CLINIC | Age: 67
End: 2022-05-12
Payer: COMMERCIAL

## 2022-05-12 DIAGNOSIS — I87.2 VENOUS INSUFFICIENCY: Primary | ICD-10-CM

## 2022-05-13 ENCOUNTER — TELEPHONE (OUTPATIENT)
Dept: FAMILY MEDICINE | Facility: CLINIC | Age: 67
End: 2022-05-13
Payer: COMMERCIAL

## 2022-06-03 DIAGNOSIS — I87.2 VENOUS REFLUX: Primary | ICD-10-CM

## 2022-06-11 ENCOUNTER — PATIENT MESSAGE (OUTPATIENT)
Dept: VASCULAR SURGERY | Facility: CLINIC | Age: 67
End: 2022-06-11
Payer: COMMERCIAL

## 2022-06-21 ENCOUNTER — HOSPITAL ENCOUNTER (OUTPATIENT)
Dept: CARDIOLOGY | Facility: HOSPITAL | Age: 67
Discharge: HOME | End: 2022-06-21
Attending: SURGERY
Payer: COMMERCIAL

## 2022-06-21 DIAGNOSIS — I87.2 VENOUS REFLUX: ICD-10-CM

## 2022-06-21 PROCEDURE — 93970 EXTREMITY STUDY: CPT | Mod: 26,,, | Performed by: SURGERY

## 2022-06-21 PROCEDURE — 93970 EXTREMITY STUDY: CPT | Mod: TC

## 2022-06-21 PROCEDURE — 93970 CV US LOWER VENOUS INSUFFICIENCY BILATERAL (CUPID ONLY): ICD-10-PCS | Mod: 26,,, | Performed by: SURGERY

## 2022-06-22 ENCOUNTER — OFFICE VISIT (OUTPATIENT)
Dept: VASCULAR SURGERY | Facility: CLINIC | Age: 67
End: 2022-06-22
Payer: COMMERCIAL

## 2022-06-22 VITALS
DIASTOLIC BLOOD PRESSURE: 72 MMHG | SYSTOLIC BLOOD PRESSURE: 128 MMHG | WEIGHT: 135.56 LBS | BODY MASS INDEX: 24.02 KG/M2

## 2022-06-22 DIAGNOSIS — I87.2 VENOUS INSUFFICIENCY OF LEFT LOWER EXTREMITY: Primary | ICD-10-CM

## 2022-06-22 DIAGNOSIS — I83.899 SYMPTOMATIC SPIDER VARICOSE VEIN: ICD-10-CM

## 2022-06-22 PROCEDURE — 3078F PR MOST RECENT DIASTOLIC BLOOD PRESSURE < 80 MM HG: ICD-10-PCS | Mod: CPTII,S$GLB,, | Performed by: SURGERY

## 2022-06-22 PROCEDURE — 1101F PR PT FALLS ASSESS DOC 0-1 FALLS W/OUT INJ PAST YR: ICD-10-PCS | Mod: CPTII,S$GLB,, | Performed by: SURGERY

## 2022-06-22 PROCEDURE — 3288F PR FALLS RISK ASSESSMENT DOCUMENTED: ICD-10-PCS | Mod: CPTII,S$GLB,, | Performed by: SURGERY

## 2022-06-22 PROCEDURE — 99999 PR PBB SHADOW E&M-EST. PATIENT-LVL III: CPT | Mod: PBBFAC,,, | Performed by: SURGERY

## 2022-06-22 PROCEDURE — 1159F MED LIST DOCD IN RCRD: CPT | Mod: CPTII,S$GLB,, | Performed by: SURGERY

## 2022-06-22 PROCEDURE — 1159F PR MEDICATION LIST DOCUMENTED IN MEDICAL RECORD: ICD-10-PCS | Mod: CPTII,S$GLB,, | Performed by: SURGERY

## 2022-06-22 PROCEDURE — 99999 PR PBB SHADOW E&M-EST. PATIENT-LVL III: ICD-10-PCS | Mod: PBBFAC,,, | Performed by: SURGERY

## 2022-06-22 PROCEDURE — 3008F BODY MASS INDEX DOCD: CPT | Mod: CPTII,S$GLB,, | Performed by: SURGERY

## 2022-06-22 PROCEDURE — 99203 PR OFFICE/OUTPT VISIT, NEW, LEVL III, 30-44 MIN: ICD-10-PCS | Mod: S$GLB,,, | Performed by: SURGERY

## 2022-06-22 PROCEDURE — 3078F DIAST BP <80 MM HG: CPT | Mod: CPTII,S$GLB,, | Performed by: SURGERY

## 2022-06-22 PROCEDURE — 99203 OFFICE O/P NEW LOW 30 MIN: CPT | Mod: S$GLB,,, | Performed by: SURGERY

## 2022-06-22 PROCEDURE — 3074F PR MOST RECENT SYSTOLIC BLOOD PRESSURE < 130 MM HG: ICD-10-PCS | Mod: CPTII,S$GLB,, | Performed by: SURGERY

## 2022-06-22 PROCEDURE — 1125F AMNT PAIN NOTED PAIN PRSNT: CPT | Mod: CPTII,S$GLB,, | Performed by: SURGERY

## 2022-06-22 PROCEDURE — 3044F HG A1C LEVEL LT 7.0%: CPT | Mod: CPTII,S$GLB,, | Performed by: SURGERY

## 2022-06-22 PROCEDURE — 3074F SYST BP LT 130 MM HG: CPT | Mod: CPTII,S$GLB,, | Performed by: SURGERY

## 2022-06-22 PROCEDURE — 3008F PR BODY MASS INDEX (BMI) DOCUMENTED: ICD-10-PCS | Mod: CPTII,S$GLB,, | Performed by: SURGERY

## 2022-06-22 PROCEDURE — 1101F PT FALLS ASSESS-DOCD LE1/YR: CPT | Mod: CPTII,S$GLB,, | Performed by: SURGERY

## 2022-06-22 PROCEDURE — 3044F PR MOST RECENT HEMOGLOBIN A1C LEVEL <7.0%: ICD-10-PCS | Mod: CPTII,S$GLB,, | Performed by: SURGERY

## 2022-06-22 PROCEDURE — 1125F PR PAIN SEVERITY QUANTIFIED, PAIN PRESENT: ICD-10-PCS | Mod: CPTII,S$GLB,, | Performed by: SURGERY

## 2022-06-22 PROCEDURE — 3288F FALL RISK ASSESSMENT DOCD: CPT | Mod: CPTII,S$GLB,, | Performed by: SURGERY

## 2022-06-22 NOTE — PATIENT INSTRUCTIONS
Putting on Compression Stockings     Turn the stocking inside-out, then fit it over your toes and heel.          Roll the stocking up your leg.            Once stockings are on, make sure the top of the stocking is about two fingers width below the crease of the knee (or the groin if you wear thigh-high stockings).          Use equipment, such as a stocking audrey, or wear rubber gloves to make it easier to put on compression stockings.         Elastic compression stockings are prescribed to treat many vein problems. Wearing them may be the most important thing you do to manage your symptoms. The stockings fit tightly around your ankle, gradually reducing in pressure as they go up your legs. This helps keep blood flowing to your heart. As a result, swelling is reduced. Your healthcare provider will prescribe stockings at a safe pressure for you. He or she will also tell you how often to wear and remove the stockings. Follow these instructions closely. Also, do not buy or wear compression stockings without first seeing your healthcare provider.  Tips for wear and care  To wear stockings safely and to get the most benefit:  Wear the length prescribed by your healthcare provider.  Pull them to the designated height and no farther. Dont let them bunch at the top. This can restrict blood flow and increase swelling.  Wear the stockings for the amount of time your healthcare provider recommends. Replace them when they start to feel loose. This will likely be every 3 to 6 months.  Remove them as your healthcare provider directs. When removed, wash your legs. Then check your legs and feet for sores. Call your healthcare provider if you find a sore. Dont put the stockings back on unless your healthcare provider directs.   Wash the stockings as instructed. They may need to be hand-washed.  Date Last Reviewed: 5/1/2016  © 4531-9080 [employer]. [address] [place] [address], [place] [address]. All rights reserved.  This information is not intended as a substitute for professional medical care. Always follow your healthcare professional's instructions.        Understanding Chronic Venous Insufficiency  Problems with the veins in the legs may lead to chronic venous insufficiency (CVI). CVI means that there is a long-term problem with the veins not being able to pump blood back to your heart. When this happens, blood stays in the legs and causes swelling and aching.   Two problems that may lead to chronic venous insufficiency are:  Damaged valves. Valves keep blood flowing from the legs through the blood vessels and back to the heart. When the valves are damaged, blood does not flow as well.   Deep vein thrombosis (DVT). Blood clots may form in the deep veins of the legs. This may cause pain, redness, and swelling in the legs. It may also block the flow of blood back to the heart. Seek immediate medical care if you have these symptoms.  A blood clot in the leg can also break off and travel to the lungs. This is called pulmonary embolism (PE). In the lungs, the clot can cut off the flow of blood. This may cause chest pain, trouble breathing, sweating, a fast heartbeat, coughing (may cough up blood), and fainting. It is a medical emergency and may cause death. Call 911 if you have these symptoms.  Healthcare providers call the two conditions, DVT and PE, venous thromboembolism (VTE).  CVI cant be cured, but you can control leg swelling to reduce the likelihood of ulcers (sores).  Recognizing the symptoms  Be aware of the following:  If you stand or sit with your feet down for long periods, your legs may ache or feel heavy.  Swollen ankles are possibly the most common symptom of CVI.  As swelling increases, the skin over your ankles may show red spots or a brownish tinge. The skin may feel leathery or scaly, and may start to itch.  If swelling is not controlled, an ulcer (open wound) may form.  What you can do  Reduce your risk of  developing ulcers by doing the following:  Increase blood flow back to your heart by elevating your legs, exercising daily, and wearing elastic stockings.  Boost blood flow in your legs by losing excess weight.  If you must stand or sit in one place for a period of time, keep your blood moving by wiggling your toes, shifting your body position, and rising up on the balls of your feet.    Date Last Reviewed: 5/1/2016  © 2266-7936 [employer]. [address] [place] [address], [place] [address]. All rights reserved. This information is not intended as a substitute for professional medical care. Always follow your healthcare professional's instructions.        Tips for Using Less Salt    Most people with heart problems need to eat less salt (sodium). Reducing the amount of salt you eat may help control your blood pressure. The higher your blood pressure, the greater your risk for heart disease, stroke, blindness, and kidney problems.  At the store  Make low-salt choices by reading labels carefully. Look for the total amount of sodium per serving.  Use more fresh food. Buy more fruits and vegetables. Select lean meats, fish, and poultry.  Use fewer frozen, canned, and packaged foods which often contain a lot of sodium.  Use plain frozen vegetables without sauces or toppings. These products are often low- or no-sodium.  Opt for reduced-sodium or no-salt-added versions of canned vegetables and soups.  In the kitchen  Don't add salt to food when you're cooking. Season with flavorings such as onion, garlic, pepper, salt-free herbal blends, and lemon or lime juice.  Use a cookbook containing low-salt recipes. It can give you ideas for tasty meals that are healthy for your heart.  Sprinkle salt-free herbal blends on vegetables and meat.  Drain and rinse canned foods, such as canned beans and vegetables, before cooking or eating.  Eating out  Tell the  you're on a low-salt diet. Ask questions about the menu.  Order  fish, chicken, and meat broiled, baked, poached, or grilled without salt, butter, or breading.  Use lemon, pepper, and salt-free herb mixes to add flavor.  Choose plain steamed rice, boiled noodles, and baked or boiled potatoes. Top potatoes with chives and a little sour cream.     Beware! Salt goes by many other names. Limit foods with these words listed as ingredients: salt, sodium, soy sauce, baking soda, baking powder, MSG, monosodium, Na (the chemical symbol for sodium). Some antacids are also high in salt.   Date Last Reviewed: 6/19/2015  © 9767-3222 [employer]. [address] [place] [address], [place] [address]. All rights reserved. This information is not intended as a substitute for professional medical care. Always follow your healthcare professional's instructions.        Low-Salt Diet  This diet removes foods that are high in salt. It also limits the amount of salt you use when cooking. It is most often used for people with high blood pressure, edema (fluid retention), and kidney, liver, or heart disease.  Table salt contains the mineral sodium. Your body needs sodium to work normally. But too much sodium can make your health problems worse. Your healthcare provider is recommending a low-salt (also called low-sodium) diet for you. Your total daily allowance of salt is 1,500 to 2,300 milligrams (mg). It is less than 1 teaspoon of table salt. This means you can have only about 500 to 700 mg of sodium at each meal. People with certain health problems should limit salt intake to the lower end of the recommended range.    When you cook, dont add much salt. If you can cook without using salt, even better. Dont add salt to your food at the table.  When shopping, read food labels. Salt is often called sodium on the label. Choose foods that are salt-free, low salt, or very low salt. Note that foods with reduced salt may not lower your salt intake enough.    Beans, potatoes, and pasta  Ok: Dry beans, split  peas, lentils, potatoes, rice, macaroni, pasta, spaghetti without added salt  Avoid: Potato chips, tortilla chips, and similar products  Breads and cereals  Ok: Low-sodium breads, rolls, cereals, and cakes; low-salt crackers, matzo crackers  Avoid: Salted crackers, pretzels, popcorn, Macedonian toast, pancakes, muffins  Dairy  Ok: Milk, chocolate milk, hot chocolate mix, low-salt cheeses, and yogurt  Avoid: Processed cheese and cheese spreads; Roquefort, Camembert, and cottage cheese; buttermilk, instant breakfast drink  Desserts  Ok: Ice cream, frozen yogurt, juice bars, gelatin, cookies and pies, sugar, honey, jelly, hard candy  Avoid: Most pies, cakes and cookies prepared or processed with salt; instant pudding  Drinks  Ok: Tea, coffee, fizzy (carbonated) drinks, juices  Avoid: Flavored coffees, electrolyte replacement drinks, sports drinks  Meats  Ok: All fresh meat, fish, poultry, low-salt tuna, eggs, egg substitute  Avoid: Smoked, pickled, brine-cured, or salted meats and fish. This includes anaya, chipped beef, corned beef, hot dogs, deli meats, ham, kosher meats, salt pork, sausage, canned tuna, salted codfish, smoked salmon, herring, sardines, or anchovies.  Seasonings and spices  Ok: Most seasonings are okay. Good substitutes for salt include: fresh herb blends, hot sauce, lemon, garlic, riddle, vinegar, dry mustard, parsley, cilantro, horseradish, tomato paste, regular margarine, mayonnaise, unsalted butter, cream cheese, vegetable oil, cream, low-salt salad dressing and gravy.  Avoid: Regular ketchup, relishes, pickles, soy sauce, teriyaki sauce, Worcestershire sauce, BBQ sauce, tartar sauce, meat tenderizer, chili sauce, regular gravy, regular salad dressing, salted butter  Soups  Ok: Low-salt soups and broths made with allowed foods  Avoid: Bouillon cubes, soups with smoked or salted meats, regular soup and broth  Vegetables  Ok: Most vegetables are okay; also low-salt tomato and vegetable juices  Avoid:  Sauerkraut and other brine-soaked vegetables; pickles and other pickled vegetables; tomato juice, olives  Date Last Reviewed: 8/1/2016  © 1814-1049 [employer]. [address] [place] [address]. All rights reserved. This information is not intended as a substitute for professional medical care. Always follow your healthcare professional's instructions.        Low-Salt Choices  Eating salt (sodium) can make your body retain too much water. Excess water makes your heart work harder. Canned, packaged, and frozen foods are easy to prepare, but they are often high in sodium. Here are some ideas for low-salt foods you can easily prepare yourself.    For breakfast  Fruit or 100% fruit juice  Whole-wheat bread or an English muffin. Compare sodium content on labels.  Low-fat milk or yogurt  Unsalted eggs  Shredded wheat  Corn tortillas  Unsalted steamed rice  Regular (not instant) hot cereal, made without salt  Stay away from:  Sausage, anaya, and ham  Flour tortillas  Packaged muffins, pancakes, and biscuits  Instant hot cereals  Cottage cheese  For lunch and dinner  Fresh fish, chicken, turkey, or meat--baked, broiled, or roasted without salt  Dry beans, cooked without salt  Tofu, stir-fried without salt  Unsalted fresh fruit and vegetables, or frozen or canned fruit and vegetables with no added salt  Stay away from:  Lunch or deli meat that is cured or smoked  Cheese  Tomato juice and catsup  Canned vegetables, soups, and fish not labeled as no-salt-added or reduced sodium  Packaged gravies and sauces  Olives, pickles, and relish  Bottled salad dressings  For snacks and desserts  Yogurt  Unsalted, air popped popcorn  Unsalted nuts or seeds  Stay away from:  Pies and cakes  Packaged dessert mixes  Pizza  Canned and packaged puddings  Pretzels, chips, crackers, and nuts--unless the label says unsalted  Date Last Reviewed: 6/17/2015  © 5524-2445 [employer]. [address] [place] [address],  [place] [address]. All rights reserved. This information is not intended as a substitute for professional medical care. Always follow your healthcare professional's instructions.

## 2022-06-22 NOTE — PROGRESS NOTES
Tanvir Cao MD, RPVI                                 [facility]                           [facility]                             2022    HPI:  Nan Avina is a 67 y.o. female with   Patient Active Problem List   Diagnosis    Situational anxiety    Bereavement counseling    Death of family member    Fever blister    Allergic rhinitis, seasonal    Migraine syndrome    Osteopenia    being managed by PCP and specialists who is here today for evaluation of BLE pain.  Patient states location is LLE>RLE occurring for yrs, recently worsening.  Associated signs and symptoms include spider and varicose veins.  Quality is aching and severity is 10/10 at worst.  Symptoms began yrs ago.  Alleviating factors include elevation.  Worsening factors include dependency.  Patient has been wearing compression stockings for greater than 3 months.  No FH of venous disease.  Symptoms do limit patient's functional status and daily activities.  no DVT history.  no venous interventions.  no low sodium diet.  no excessive water intake.    Migraine with aura: no  PFO/ASD/right to left shunt: no  Pregnant: no  Breastfeeding: no    no MI  no Stroke  Tobacco use: no    Past Medical History:   Diagnosis Date    Allergic rhinitis, seasonal 2016    Fever blister     Migraine headache     Migraine syndrome 2016    Osteopenia 2019    Polycystic ovarian syndrome     Vertigo      Past Surgical History:   Procedure Laterality Date    BREAST BIOPSY      DILATION AND CURETTAGE OF UTERUS      [facility]Y      [facility]    INCISIONAL BREAST BIOPSY  2004    OOPHORECTOMY      TONSILLECTOMY, ADENOIDECTOMY      Vocal cord nodules       Family History   Problem Relation Age of Onset    Cancer Brother         The patient's brother  from cancer at age 58 unknown source of cancer possible liver    Diabetes Brother     Breast cancer Maternal Aunt     Breast cancer  Maternal Aunt     Stroke Father     Coronary artery disease Father     Coronary artery disease Mother     Stroke Mother     Breast cancer Maternal Aunt     Colon cancer Neg Hx     Ovarian cancer Neg Hx     Hypertension Neg Hx      Social History     Socioeconomic History    Marital status:    Occupational History    Occupation: Unemployed   Tobacco Use    Smoking status: Never Smoker    Smokeless tobacco: Never Used   Substance and Sexual Activity    Alcohol use: Yes     Alcohol/week: 1.0 standard drink     Types: 1 Glasses of wine per week     Comment: occasional    Drug use: No    Sexual activity: Yes     Partners: Male     Birth control/protection: Surgical     Comment:  since 1976   Social History Narrative    . First grandchild born 9/2012.     Works at a school.        Current Outpatient Medications:     acetaminophen (TYLENOL) 100 mg/mL suspension, Take by mouth every 4 (four) hours as needed for Temperature greater than., Disp: , Rfl:     diphenhydramine HCl (BENADRYL ALLERGY ORAL), Take by mouth as needed., Disp: , Rfl:     estradioL (VIVELLE-DOT) 0.025 mg/24 hr, Place 1 patch onto the skin twice a week., Disp: 8 patch, Rfl: 12    fexofenadine HCl (ALLEGRA ORAL), Take by mouth as needed., Disp: , Rfl:     loratadine (CLARITIN) 5 mg chewable tablet, Take 5 mg by mouth once daily., Disp: , Rfl:     omeprazole (PRILOSEC) 40 MG capsule, Take 1 capsule (40 mg total) by mouth once daily., Disp: 30 capsule, Rfl: 12    valACYclovir (VALTREX) 1000 MG tablet, 2 pills the repeat 2 pills 12 hrs later, Disp: 30 tablet, Rfl: 12    REVIEW OF SYSTEMS:  General: No fevers or chills; ENT: No sore throat; Allergy and Immunology: no persistent infections; Hematological and Lymphatic: No history of bleeding or easy bruising; Endocrine: negative; Respiratory: no cough, shortness of breath, or wheezing; Cardiovascular: no chest pain or dyspnea on exertion; Gastrointestinal: no abdominal  pain/back, change in bowel habits, or bloody stools; Genito-Urinary: no dysuria, trouble voiding, or hematuria; Musculoskeletal: edema and pain; Neurological: no TIA or stroke symptoms; Psychiatric: no nervousness, anxiety or depression.    PHYSICAL EXAM:                General appearance:  Alert, well-appearing, and in no distress.  Oriented to person, place, and time                    Neurological: Normal speech, no focal findings noted; CN II - XII grossly intact. RLE with sensation to light touch, LLE with sensation to light touch.            Musculoskeletal: Digits/nail without cyanosis/clubbing.  Strength 5/5 BLE.                    Neck: Supple, no significant adenopathy                  Chest:  No wheezes, symmetric air entry. No use of accessory muscles               Cardiac: Normal rate and regular rhythm            Abdomen: Soft, nontender, nondistended      Extremities:   2+ R DP pulse, 2+ L DP pulse      1+ RLE edema, 1+ LLE edema    Skin:  RLE no ulcer; LLE no ulcer      RLE + spider veins, LLE + spider veins      RLE + varicose veins, LLE + varicose veins    CEAP 3/3    VCSS 12    LAB RESULTS:  No results found for: CBC  No results found for: LABPROT, INR  Lab Results   Component Value Date     04/02/2022    K 4.1 04/02/2022     04/02/2022    CO2 28 04/02/2022    GLU 98 04/02/2022    BUN 21 04/02/2022    CREATININE 0.7 04/02/2022    CALCIUM 8.9 04/02/2022    ANIONGAP 5 (L) 04/02/2022    EGFRNONAA >60 04/02/2022     Lab Results   Component Value Date    WBC 5.81 04/02/2022    RBC 4.50 04/02/2022    HGB 14.6 04/02/2022    HCT 43.9 04/02/2022    MCV 98 04/02/2022    MCH 32.4 (H) 04/02/2022    MCHC 33.3 04/02/2022    RDW 12.8 04/02/2022     04/02/2022    MPV 9.8 04/02/2022    GRAN 3.8 04/02/2022    GRAN 64.8 04/02/2022    LYMPH 1.3 04/02/2022    LYMPH 22.9 04/02/2022    MONO 0.6 04/02/2022    MONO 9.5 04/02/2022    EOS 0.1 04/02/2022    BASO 0.03 04/02/2022    EOSINOPHIL 2.1 04/02/2022     BASOPHIL 0.5 04/02/2022    DIFFMETHOD Automated 04/02/2022     .  Lab Results   Component Value Date    HGBA1C 5.3 04/02/2022       IMAGING:  All pertinent imaging has been reviewed and interpreted independently.    Venous US 4/2022 Impression:  EXAMINATION:  US LOWER EXTREMITY VENOUS INSUFFICIENCY LEFT     CLINICAL HISTORY:  Pain in left leg     TECHNIQUE:  Left lower extremity venous Doppler exam including Valsalva or standing maneuvers for evaluation of venous insufficiency of the superficial systems bilaterally.  Reflux times greater than 500 ms were considered indicative of superficial system reflux.     COMPARISON:  None     FINDINGS:  Technique: Sonographic evaluation of left lower extremity deep venous systems was performed. Grey scale, color flow and spectral doppler was performed.     With the patient standing and supporting weight on contralateral leg insufficiency within the greater saphenous and lesser saphenous venous system was tested with augmentation via distal manual compression.     Deep venous system:     There is evidence of color Doppler patency and upper compressibility within the left common femoral, femoral, popliteal vein, peroneal, posterior tibial, and anterior tibial veins.     Superficial venous system:     Left leg:     There is evidence of reflux lasting longer than 500 ms in the left greater saphenous and lesser saphenous veins.     The maximal diameter within the left greater saphenous vein is 11.2 mm.     The maximal diameter within the left lesser saphenous vein is 8.3 mm.     Impression:     1. There is evidence of hemodynamically significant venous reflux in the left greater and lesser saphenous veins.     2. There is no evidence of bilateral lower extremity deep venous thrombosis.        Electronically signed by: Juvencio Ya  Date:                                            04/08/2022  Time:                                           08:09             Exam Ended:  04/07/22 16:30                IMP/PLAN:  67 y.o. female with   Patient Active Problem List   Diagnosis    Situational anxiety    Bereavement counseling    Death of family member    Fever blister    Allergic rhinitis, seasonal    Migraine syndrome    Osteopenia    being managed by PCP and specialists who is here today for evaluation of BLE pain, edema and spider varicose veins.    -recommend compression with Rx stockings, elevation, dietary changes associated with water and sodium intake discussed at length with patient  -Exercise   -RTC 3 months for further evaluation    I spent 15 minutes evaluating this patient and greater than 50% of the time was spent counseling, coordinator care and discussing the plan of care.  All questions were answered and patient stated understanding with agreement with the above treatment plan.    Tanvir Cao MD [facility]  Vascular and Endovascular Surgery

## 2022-06-23 ENCOUNTER — TELEPHONE (OUTPATIENT)
Dept: VASCULAR SURGERY | Facility: CLINIC | Age: 67
End: 2022-06-23
Payer: COMMERCIAL

## 2022-06-23 NOTE — TELEPHONE ENCOUNTER
Called pt and did not get an answer; left message.      ----- Message from Nataly Rae sent at 6/22/2022 10:37 AM CDT -----  Regarding: self  .Type: Patient Call Back    Who called: self     What is the request in detail: has questions regarding her appt today and orders for knee high socks but was under the impression  that it was for thigh and calf area. Please call.     Can the clinic reply by [facility]? No     Would the patient rather a call back or a response via [facility]?  Call     Best call back number .[phone]

## 2022-06-24 ENCOUNTER — PATIENT MESSAGE (OUTPATIENT)
Dept: VASCULAR SURGERY | Facility: CLINIC | Age: 67
End: 2022-06-24
Payer: COMMERCIAL

## 2022-06-29 LAB
LEFT GREAT SAPHENOUS DISTAL THIGH DIA: 0.4 CM
LEFT GREAT SAPHENOUS JUNCTION DIA: 0.6 CM
LEFT GREAT SAPHENOUS KNEE DIA: 0.3 CM
LEFT GREAT SAPHENOUS MIDDLE THIGH DIA: 0.4 CM
LEFT GREAT SAPHENOUS PROXIMAL CALF DIA: 0.3 CM
LEFT SMALL SAPHENOUS KNEE DIA: 0.2 CM
LEFT SMALL SAPHENOUS SPJ DIA: 0.1 CM
RIGHT GREAT SAPHENOUS DISTAL THIGH DIA: 0.4 CM
RIGHT GREAT SAPHENOUS JUNCTION DIA: 0.7 CM
RIGHT GREAT SAPHENOUS KNEE DIA: 0.4 CM
RIGHT GREAT SAPHENOUS MIDDLE THIGH DIA: 0.4 CM
RIGHT GREAT SAPHENOUS PROXIMAL CALF DIA: 0.3 CM
RIGHT SMALL SAPHENOUS KNEE DIA: 0.3 CM
RIGHT SMALL SAPHENOUS SPJ DIA: 0.3 CM

## 2022-07-01 DIAGNOSIS — I87.2 VENOUS INSUFFICIENCY OF LEFT LOWER EXTREMITY: Primary | ICD-10-CM

## 2022-07-06 ENCOUNTER — PATIENT MESSAGE (OUTPATIENT)
Dept: VASCULAR SURGERY | Facility: CLINIC | Age: 67
End: 2022-07-06
Payer: COMMERCIAL

## 2022-08-10 ENCOUNTER — OFFICE VISIT (OUTPATIENT)
Dept: OBSTETRICS AND GYNECOLOGY | Facility: CLINIC | Age: 67
End: 2022-08-10
Payer: COMMERCIAL

## 2022-08-10 VITALS
SYSTOLIC BLOOD PRESSURE: 128 MMHG | DIASTOLIC BLOOD PRESSURE: 80 MMHG | HEIGHT: 63 IN | WEIGHT: 134.06 LBS | BODY MASS INDEX: 23.75 KG/M2

## 2022-08-10 DIAGNOSIS — Z01.419 ENCOUNTER FOR GYNECOLOGICAL EXAMINATION WITHOUT ABNORMAL FINDING: Primary | ICD-10-CM

## 2022-08-10 DIAGNOSIS — Z12.31 SCREENING MAMMOGRAM, ENCOUNTER FOR: ICD-10-CM

## 2022-08-10 DIAGNOSIS — E89.40 POSTSURGICAL MENOPAUSE: ICD-10-CM

## 2022-08-10 PROCEDURE — 3288F PR FALLS RISK ASSESSMENT DOCUMENTED: ICD-10-PCS | Mod: CPTII,S$GLB,, | Performed by: OBSTETRICS & GYNECOLOGY

## 2022-08-10 PROCEDURE — 1159F MED LIST DOCD IN RCRD: CPT | Mod: CPTII,S$GLB,, | Performed by: OBSTETRICS & GYNECOLOGY

## 2022-08-10 PROCEDURE — 3008F BODY MASS INDEX DOCD: CPT | Mod: CPTII,S$GLB,, | Performed by: OBSTETRICS & GYNECOLOGY

## 2022-08-10 PROCEDURE — 3288F FALL RISK ASSESSMENT DOCD: CPT | Mod: CPTII,S$GLB,, | Performed by: OBSTETRICS & GYNECOLOGY

## 2022-08-10 PROCEDURE — 3074F PR MOST RECENT SYSTOLIC BLOOD PRESSURE < 130 MM HG: ICD-10-PCS | Mod: CPTII,S$GLB,, | Performed by: OBSTETRICS & GYNECOLOGY

## 2022-08-10 PROCEDURE — 3044F HG A1C LEVEL LT 7.0%: CPT | Mod: CPTII,S$GLB,, | Performed by: OBSTETRICS & GYNECOLOGY

## 2022-08-10 PROCEDURE — 1101F PT FALLS ASSESS-DOCD LE1/YR: CPT | Mod: CPTII,S$GLB,, | Performed by: OBSTETRICS & GYNECOLOGY

## 2022-08-10 PROCEDURE — 3008F PR BODY MASS INDEX (BMI) DOCUMENTED: ICD-10-PCS | Mod: CPTII,S$GLB,, | Performed by: OBSTETRICS & GYNECOLOGY

## 2022-08-10 PROCEDURE — 1159F PR MEDICATION LIST DOCUMENTED IN MEDICAL RECORD: ICD-10-PCS | Mod: CPTII,S$GLB,, | Performed by: OBSTETRICS & GYNECOLOGY

## 2022-08-10 PROCEDURE — 99999 PR PBB SHADOW E&M-EST. PATIENT-LVL III: ICD-10-PCS | Mod: PBBFAC,,, | Performed by: OBSTETRICS & GYNECOLOGY

## 2022-08-10 PROCEDURE — 3079F DIAST BP 80-89 MM HG: CPT | Mod: CPTII,S$GLB,, | Performed by: OBSTETRICS & GYNECOLOGY

## 2022-08-10 PROCEDURE — 99397 PER PM REEVAL EST PAT 65+ YR: CPT | Mod: S$GLB,,, | Performed by: OBSTETRICS & GYNECOLOGY

## 2022-08-10 PROCEDURE — 99397 PR PREVENTIVE VISIT,EST,65 & OVER: ICD-10-PCS | Mod: S$GLB,,, | Performed by: OBSTETRICS & GYNECOLOGY

## 2022-08-10 PROCEDURE — 1126F AMNT PAIN NOTED NONE PRSNT: CPT | Mod: CPTII,S$GLB,, | Performed by: OBSTETRICS & GYNECOLOGY

## 2022-08-10 PROCEDURE — 99999 PR PBB SHADOW E&M-EST. PATIENT-LVL III: CPT | Mod: PBBFAC,,, | Performed by: OBSTETRICS & GYNECOLOGY

## 2022-08-10 PROCEDURE — 1126F PR PAIN SEVERITY QUANTIFIED, NO PAIN PRESENT: ICD-10-PCS | Mod: CPTII,S$GLB,, | Performed by: OBSTETRICS & GYNECOLOGY

## 2022-08-10 PROCEDURE — 1101F PR PT FALLS ASSESS DOC 0-1 FALLS W/OUT INJ PAST YR: ICD-10-PCS | Mod: CPTII,S$GLB,, | Performed by: OBSTETRICS & GYNECOLOGY

## 2022-08-10 PROCEDURE — 3074F SYST BP LT 130 MM HG: CPT | Mod: CPTII,S$GLB,, | Performed by: OBSTETRICS & GYNECOLOGY

## 2022-08-10 PROCEDURE — 3044F PR MOST RECENT HEMOGLOBIN A1C LEVEL <7.0%: ICD-10-PCS | Mod: CPTII,S$GLB,, | Performed by: OBSTETRICS & GYNECOLOGY

## 2022-08-10 PROCEDURE — 3079F PR MOST RECENT DIASTOLIC BLOOD PRESSURE 80-89 MM HG: ICD-10-PCS | Mod: CPTII,S$GLB,, | Performed by: OBSTETRICS & GYNECOLOGY

## 2022-08-10 RX ORDER — ESTRADIOL 0.03 MG/D
1 FILM, EXTENDED RELEASE TRANSDERMAL
Qty: 8 PATCH | Refills: 12 | Status: SHIPPED | OUTPATIENT
Start: 2022-08-11 | End: 2023-09-06 | Stop reason: SDUPTHER

## 2022-08-10 NOTE — PROGRESS NOTES
Subjective:       Patient ID: Nan Avina is a 67 y.o. female.    Chief Complaint:  Annual Exam and Gynecologic Exam      History of Present Illness  HPI  Nan Avina is a 67 y.o. female  here for her annual GYN exam.   She is doing well on her Vivelle dot patch and no longer has headaches since her dose was decreased. Wants to continue on it. No longer has vasomotor symptoms.   denies vaginal itching or irritation.  Denies vaginal discharge.  She is sexually active. She has had1 partner  for 44 years .   History of abnormal pap: No  Last Pap: had Hysterectomy  Last MMG: normal--routine follow-up in 12 months  Last Colonoscopy:  Over due, plans on Cologuard  denies domestic violence. She does feel safe at home.     Past Medical History:   Diagnosis Date    Allergic rhinitis, seasonal 2016    Fever blister     Migraine headache     Migraine syndrome 2016    Osteopenia 2019    Polycystic ovarian syndrome     Vertigo      Past Surgical History:   Procedure Laterality Date    BREAST BIOPSY      DILATION AND CURETTAGE OF UTERUS      [facility]      [facility]    INCISIONAL BREAST BIOPSY      OOPHORECTOMY      TONSILLECTOMY, ADENOIDECTOMY      Vocal cord nodules       Social History     Socioeconomic History    Marital status:    Occupational History    Occupation: Unemployed   Tobacco Use    Smoking status: Never Smoker    Smokeless tobacco: Never Used   Substance and Sexual Activity    Alcohol use: Yes     Alcohol/week: 1.0 standard drink     Types: 1 Glasses of wine per week     Comment: occasional    Drug use: No    Sexual activity: Yes     Partners: Male     Birth control/protection: Surgical     Comment:  since    Social History Narrative    . First grandchild born 2012.     Works at a school.      Family History   Problem Relation Age of Onset    Cancer Brother         The patient's brother  from cancer at age 58  "unknown source of cancer possible liver    Diabetes Brother     Breast cancer Maternal Aunt     Breast cancer Maternal Aunt     Stroke Father     Coronary artery disease Father     Coronary artery disease Mother     Stroke Mother     Breast cancer Maternal Aunt     Colon cancer Neg Hx     Ovarian cancer Neg Hx     Hypertension Neg Hx      OB History        3    Para   3    Term   3            AB        Living   3       SAB        IAB        Ectopic        Multiple        Live Births   3                 /80   Ht 5' 3" (1.6 m)   Wt 60.8 kg (134 lb 0.6 oz)   BMI 23.74 kg/m²         GYN & OB History    Date of Last Pap: No result found    OB History    Para Term  AB Living   3 3 3     3   SAB IAB Ectopic Multiple Live Births           3      # Outcome Date GA Lbr Hang/2nd Weight Sex Delivery Anes PTL Lv   3 Term      Vag-Spont   ISAI   2 Term      Vag-Spont   ISAI   1 Term      Vag-Spont   ISAI       Review of Systems  Review of Systems   Constitutional: Negative for activity change, appetite change, fatigue and unexpected weight change.   HENT: Negative.    Eyes: Negative for visual disturbance.   Respiratory: Negative for shortness of breath and wheezing.    Cardiovascular: Negative for chest pain, palpitations and leg swelling.   Gastrointestinal: Negative for abdominal pain, bloating and blood in stool.   Endocrine: Negative for diabetes and hair loss.   Genitourinary: Negative for decreased libido, dyspareunia, hot flashes and vaginal dryness.   Musculoskeletal: Negative for back pain and joint swelling.   Integumentary:  Negative for acne, hair changes and nipple discharge.   Neurological: Negative for headaches.   Hematological: Does not bruise/bleed easily.   Psychiatric/Behavioral: Negative for depression and sleep disturbance. The patient is not nervous/anxious.    Breast: Negative for mastodynia and nipple discharge          Objective:      Physical Exam: "   Constitutional: She is oriented to person, place, and time. She appears well-developed and well-nourished.    HENT:   Head: Normocephalic and atraumatic.    Eyes: Pupils are equal, round, and reactive to light. EOM are normal.     Cardiovascular: Normal rate and regular rhythm.     Pulmonary/Chest: Effort normal and breath sounds normal.   BREASTS:  no mass, no tenderness, no deformity and no retraction. Right breast exhibits no inverted nipple, no mass, no nipple discharge, no skin change, no tenderness, no bleeding and no swelling. Left breast exhibits no inverted nipple, no mass, no nipple discharge, no skin change, no tenderness, no bleeding and no swelling. Breasts are symmetrical.              Abdominal: Soft. Bowel sounds are normal.     Genitourinary:    Pelvic exam was performed with patient supine.      Genitourinary Comments: PELVIC: Normal external female genitalia without lesions. Normal hair distribution. Adequate perineal body, normal urethral meatus. Vagina Moist and well rugated without lesions or discharge. No significant cystocele , second degree rectocele. Bimanual exam shows uterus and cervix to be surgically absent. Adnexa without masses or tenderness.  RECTAL: Deferred               Musculoskeletal: Normal range of motion and moves all extremeties.       Neurological: She is alert and oriented to person, place, and time.    Skin: Skin is warm and dry.    Psychiatric: She has a normal mood and affect.              Assessment:        1. Encounter for gynecological examination without abnormal finding    2. Postsurgical menopause    3. Screening mammogram, encounter for               Plan:      1. Encounter for gynecological examination without abnormal finding  COUNSELING:  The patient was counseled today on regular weight bearing exercise. Patient was counseled today on the new ACS guidelines for cervical cytology screening as well as the current recommendations for breast cancer screening.  Counseling session lasted approximately 10 minutes, and all her questions were answered. She was advised to see her primary care physician for all other health maintenance.   FOLLOW-UP with me for next routine visit.         2. Postsurgical menopause    - estradioL (VIVELLE-DOT) 0.025 mg/24 hr; Place 1 patch onto the skin twice a week.  Dispense: 8 patch; Refill: 12    3. Screening mammogram, encounter for      - Mammo Digital Screening Bilat w/ Manuel; Future       Follow up in about 1 year (around 8/10/2023).

## 2022-09-07 DIAGNOSIS — Z78.0 MENOPAUSE: ICD-10-CM

## 2022-09-26 ENCOUNTER — OFFICE VISIT (OUTPATIENT)
Dept: VASCULAR SURGERY | Facility: CLINIC | Age: 67
End: 2022-09-26
Payer: COMMERCIAL

## 2022-09-26 VITALS — SYSTOLIC BLOOD PRESSURE: 149 MMHG | BODY MASS INDEX: 23.9 KG/M2 | WEIGHT: 134.94 LBS | DIASTOLIC BLOOD PRESSURE: 85 MMHG

## 2022-09-26 DIAGNOSIS — I87.2 VENOUS INSUFFICIENCY OF LEFT LOWER EXTREMITY: Primary | ICD-10-CM

## 2022-09-26 DIAGNOSIS — I83.899 SYMPTOMATIC SPIDER VARICOSE VEIN: ICD-10-CM

## 2022-09-26 PROCEDURE — 3288F PR FALLS RISK ASSESSMENT DOCUMENTED: ICD-10-PCS | Mod: CPTII,S$GLB,, | Performed by: SURGERY

## 2022-09-26 PROCEDURE — 1101F PT FALLS ASSESS-DOCD LE1/YR: CPT | Mod: CPTII,S$GLB,, | Performed by: SURGERY

## 2022-09-26 PROCEDURE — 1126F PR PAIN SEVERITY QUANTIFIED, NO PAIN PRESENT: ICD-10-PCS | Mod: CPTII,S$GLB,, | Performed by: SURGERY

## 2022-09-26 PROCEDURE — 3077F SYST BP >= 140 MM HG: CPT | Mod: CPTII,S$GLB,, | Performed by: SURGERY

## 2022-09-26 PROCEDURE — 3044F HG A1C LEVEL LT 7.0%: CPT | Mod: CPTII,S$GLB,, | Performed by: SURGERY

## 2022-09-26 PROCEDURE — 3077F PR MOST RECENT SYSTOLIC BLOOD PRESSURE >= 140 MM HG: ICD-10-PCS | Mod: CPTII,S$GLB,, | Performed by: SURGERY

## 2022-09-26 PROCEDURE — 1159F MED LIST DOCD IN RCRD: CPT | Mod: CPTII,S$GLB,, | Performed by: SURGERY

## 2022-09-26 PROCEDURE — 3044F PR MOST RECENT HEMOGLOBIN A1C LEVEL <7.0%: ICD-10-PCS | Mod: CPTII,S$GLB,, | Performed by: SURGERY

## 2022-09-26 PROCEDURE — 3008F PR BODY MASS INDEX (BMI) DOCUMENTED: ICD-10-PCS | Mod: CPTII,S$GLB,, | Performed by: SURGERY

## 2022-09-26 PROCEDURE — 99213 OFFICE O/P EST LOW 20 MIN: CPT | Mod: S$GLB,,, | Performed by: SURGERY

## 2022-09-26 PROCEDURE — 1159F PR MEDICATION LIST DOCUMENTED IN MEDICAL RECORD: ICD-10-PCS | Mod: CPTII,S$GLB,, | Performed by: SURGERY

## 2022-09-26 PROCEDURE — 1101F PR PT FALLS ASSESS DOC 0-1 FALLS W/OUT INJ PAST YR: ICD-10-PCS | Mod: CPTII,S$GLB,, | Performed by: SURGERY

## 2022-09-26 PROCEDURE — 3079F PR MOST RECENT DIASTOLIC BLOOD PRESSURE 80-89 MM HG: ICD-10-PCS | Mod: CPTII,S$GLB,, | Performed by: SURGERY

## 2022-09-26 PROCEDURE — 99999 PR PBB SHADOW E&M-EST. PATIENT-LVL III: CPT | Mod: PBBFAC,,, | Performed by: SURGERY

## 2022-09-26 PROCEDURE — 99999 PR PBB SHADOW E&M-EST. PATIENT-LVL III: ICD-10-PCS | Mod: PBBFAC,,, | Performed by: SURGERY

## 2022-09-26 PROCEDURE — 3079F DIAST BP 80-89 MM HG: CPT | Mod: CPTII,S$GLB,, | Performed by: SURGERY

## 2022-09-26 PROCEDURE — 1126F AMNT PAIN NOTED NONE PRSNT: CPT | Mod: CPTII,S$GLB,, | Performed by: SURGERY

## 2022-09-26 PROCEDURE — 3288F FALL RISK ASSESSMENT DOCD: CPT | Mod: CPTII,S$GLB,, | Performed by: SURGERY

## 2022-09-26 PROCEDURE — 3008F BODY MASS INDEX DOCD: CPT | Mod: CPTII,S$GLB,, | Performed by: SURGERY

## 2022-09-26 PROCEDURE — 99213 PR OFFICE/OUTPT VISIT, EST, LEVL III, 20-29 MIN: ICD-10-PCS | Mod: S$GLB,,, | Performed by: SURGERY

## 2022-09-26 NOTE — PROGRESS NOTES
Tanvir Cao MD, RPVI                                 [facility]                           [facility]                             2022    HPI:  Nan Avina is a 67 y.o. female with   Patient Active Problem List   Diagnosis    Situational anxiety    Bereavement counseling    Death of family member    Fever blister    Allergic rhinitis, seasonal    Migraine syndrome    Osteopenia    being managed by PCP and specialists who is here today for evaluation of BLE pain.  Patient states location is LLE>RLE occurring for yrs, recently worsening.  Associated signs and symptoms include spider and varicose veins.  Quality is aching and severity is 10/10 at worst.  Symptoms began yrs ago.  Alleviating factors include elevation.  Worsening factors include dependency.  Patient has been wearing compression stockings for greater than 3 months.  No FH of venous disease.  Symptoms do limit patient's functional status and daily activities.  no DVT history.  no venous interventions.  no low sodium diet.  no excessive water intake.    Migraine with aura: no  PFO/ASD/right to left shunt: no  Pregnant: no  Breastfeeding: no    no MI  no Stroke  Tobacco use: no    2022:  c/o LLE spider vein pain despite compression and elevation > 3 mo.    Past Medical History:   Diagnosis Date    Allergic rhinitis, seasonal 2016    Fever blister     Migraine headache     Migraine syndrome 2016    Osteopenia 2019    Polycystic ovarian syndrome     Vertigo      Past Surgical History:   Procedure Laterality Date    BREAST BIOPSY      DILATION AND CURETTAGE OF UTERUS      HYSTERECTOMY      [facility]    INCISIONAL BREAST BIOPSY  2004    OOPHORECTOMY      TONSILLECTOMY, ADENOIDECTOMY      Vocal cord nodules       Family History   Problem Relation Age of Onset    Cancer Brother         The patient's brother  from cancer at age 58 unknown source of cancer possible liver    Diabetes Brother      Breast cancer Maternal Aunt     Breast cancer Maternal Aunt     Stroke Father     Coronary artery disease Father     Coronary artery disease Mother     Stroke Mother     Breast cancer Maternal Aunt     Colon cancer Neg Hx     Ovarian cancer Neg Hx     Hypertension Neg Hx      Social History     Socioeconomic History    Marital status:    Occupational History    Occupation: Unemployed   Tobacco Use    Smoking status: Never    Smokeless tobacco: Never   Substance and Sexual Activity    Alcohol use: Yes     Alcohol/week: 5.0 standard drinks     Types: 5 Glasses of wine per week     Comment: occasional    Drug use: No    Sexual activity: Yes     Partners: Male     Birth control/protection: Surgical     Comment:  since 1976   Social History Narrative    . First grandchild born 9/2012.     Works at a school.        Current Outpatient Medications:     acetaminophen (TYLENOL) 100 mg/mL suspension, Take by mouth every 4 (four) hours as needed for Temperature greater than., Disp: , Rfl:     diphenhydramine HCl (BENADRYL ALLERGY ORAL), Take by mouth as needed., Disp: , Rfl:     estradioL (VIVELLE-DOT) 0.025 mg/24 hr, Place 1 patch onto the skin twice a week., Disp: 8 patch, Rfl: 12    fexofenadine HCl (ALLEGRA ORAL), Take by mouth as needed., Disp: , Rfl:     loratadine (CLARITIN) 5 mg chewable tablet, Take 5 mg by mouth as needed., Disp: , Rfl:     mv-mn/iron/folic acid/herb 190 (VITAMIN D3 COMPLETE ORAL), Take by mouth once daily., Disp: , Rfl:     valACYclovir (VALTREX) 1000 MG tablet, 2 pills the repeat 2 pills 12 hrs later (Patient taking differently: as needed. 2 pills the repeat 2 pills 12 hrs later), Disp: 30 tablet, Rfl: 12    omeprazole (PRILOSEC) 40 MG capsule, Take 1 capsule (40 mg total) by mouth once daily., Disp: 30 capsule, Rfl: 12    REVIEW OF SYSTEMS:  General: No fevers or chills; ENT: No sore throat; Allergy and Immunology: no persistent infections; Hematological and Lymphatic: No  history of bleeding or easy bruising; Endocrine: negative; Respiratory: no cough, shortness of breath, or wheezing; Cardiovascular: no chest pain or dyspnea on exertion; Gastrointestinal: no abdominal pain/back, change in bowel habits, or bloody stools; Genito-Urinary: no dysuria, trouble voiding, or hematuria; Musculoskeletal: edema and pain; Neurological: no TIA or stroke symptoms; Psychiatric: no nervousness, anxiety or depression.    PHYSICAL EXAM:                General appearance:  Alert, well-appearing, and in no distress.  Oriented to person, place, and time                    Neurological: Normal speech, no focal findings noted; CN II - XII grossly intact. RLE with sensation to light touch, LLE with sensation to light touch.            Musculoskeletal: Digits/nail without cyanosis/clubbing.  Strength 5/5 BLE.                    Neck: Supple, no significant adenopathy                  Chest:  No wheezes, symmetric air entry. No use of accessory muscles               Cardiac: Normal rate and regular rhythm            Abdomen: Soft, nontender, nondistended      Extremities:   2+ R DP pulse, 2+ L DP pulse      1+ RLE edema, 1+ LLE edema    Skin:  RLE no ulcer; LLE no ulcer      RLE + spider veins, LLE + spider veins      RLE + varicose veins, LLE + varicose veins    CEAP 3/3    VCSS 12    LAB RESULTS:  No results found for: CBC  No results found for: LABPROT, INR  Lab Results   Component Value Date     04/02/2022    K 4.1 04/02/2022     04/02/2022    CO2 28 04/02/2022    GLU 98 04/02/2022    BUN 21 04/02/2022    CREATININE 0.7 04/02/2022    CALCIUM 8.9 04/02/2022    ANIONGAP 5 (L) 04/02/2022    EGFRNONAA >60 04/02/2022     Lab Results   Component Value Date    WBC 5.81 04/02/2022    RBC 4.50 04/02/2022    HGB 14.6 04/02/2022    HCT 43.9 04/02/2022    MCV 98 04/02/2022    MCH 32.4 (H) 04/02/2022    MCHC 33.3 04/02/2022    RDW 12.8 04/02/2022     04/02/2022    MPV 9.8 04/02/2022    GRAN 3.8  04/02/2022    GRAN 64.8 04/02/2022    LYMPH 1.3 04/02/2022    LYMPH 22.9 04/02/2022    MONO 0.6 04/02/2022    MONO 9.5 04/02/2022    EOS 0.1 04/02/2022    BASO 0.03 04/02/2022    EOSINOPHIL 2.1 04/02/2022    BASOPHIL 0.5 04/02/2022    DIFFMETHOD Automated 04/02/2022     .  Lab Results   Component Value Date    HGBA1C 5.3 04/02/2022       IMAGING:  All pertinent imaging has been reviewed and interpreted independently.    Venous US 4/2022 Impression:  EXAMINATION:  US LOWER EXTREMITY VENOUS INSUFFICIENCY LEFT     CLINICAL HISTORY:  Pain in left leg     TECHNIQUE:  Left lower extremity venous Doppler exam including Valsalva or standing maneuvers for evaluation of venous insufficiency of the superficial systems bilaterally.  Reflux times greater than 500 ms were considered indicative of superficial system reflux.     COMPARISON:  None     FINDINGS:  Technique: Sonographic evaluation of left lower extremity deep venous systems was performed. Grey scale, color flow and spectral doppler was performed.     With the patient standing and supporting weight on contralateral leg insufficiency within the greater saphenous and lesser saphenous venous system was tested with augmentation via distal manual compression.     Deep venous system:     There is evidence of color Doppler patency and upper compressibility within the left common femoral, femoral, popliteal vein, peroneal, posterior tibial, and anterior tibial veins.     Superficial venous system:     Left leg:     There is evidence of reflux lasting longer than 500 ms in the left greater saphenous and lesser saphenous veins.     The maximal diameter within the left greater saphenous vein is 11.2 mm.     The maximal diameter within the left lesser saphenous vein is 8.3 mm.     Impression:     1. There is evidence of hemodynamically significant venous reflux in the left greater and lesser saphenous veins.     2. There is no evidence of bilateral lower extremity deep venous  thrombosis.        Electronically signed by: Juvencio Ya  Date:                                            04/08/2022  Time:                                           08:09             Exam Ended: 04/07/22 16:30                IMP/PLAN:  67 y.o. female with   Patient Active Problem List   Diagnosis    Situational anxiety    Bereavement counseling    Death of family member    Fever blister    Allergic rhinitis, seasonal    Migraine syndrome    Osteopenia    being managed by PCP and specialists who is here today for evaluation of BLE pain, edema and spider varicose veins.    -recommend compression with Rx stockings, elevation, dietary changes associated with water and sodium intake discussed at length with patient  -Exercise   -rec L SSV EVLT  -Future BLE sclerotherapy  -RTC 3 months for further evaluation    I spent 15 minutes evaluating this patient and greater than 50% of the time was spent counseling, coordinator care and discussing the plan of care.  All questions were answered and patient stated understanding with agreement with the above treatment plan.    Tanvir Cao MD [facility]  Vascular and Endovascular Surgery

## 2022-09-27 DIAGNOSIS — I87.2 VENOUS INSUFFICIENCY: Primary | ICD-10-CM

## 2022-09-28 ENCOUNTER — TELEPHONE (OUTPATIENT)
Dept: NEUROLOGY | Facility: CLINIC | Age: 67
End: 2022-09-28
Payer: COMMERCIAL

## 2022-09-28 NOTE — TELEPHONE ENCOUNTER
Called patient to schedule appointment with Neurology. Patient states she specified to Dr. Cao she would like a referral to a neurologist in [facility], not a neurologist from Health system. Patient was informed Dr. Cao's staff will be notified and she will be contacted to schedule an appointment at [facility]. Patient verbally acknowledged and agreed.

## 2022-09-29 ENCOUNTER — TELEPHONE (OUTPATIENT)
Dept: VASCULAR SURGERY | Facility: CLINIC | Age: 67
End: 2022-09-29
Payer: COMMERCIAL

## 2022-09-29 NOTE — TELEPHONE ENCOUNTER
Pt was called back she informed me that she scheduled her Neurology appt and wanted to know if she had done it right. I let her know that she had done it correct.      ----- Message from Katia Meza LPN sent at 9/28/2022 11:11 AM CDT -----    ----- Message -----  From: Sofie Faulkner  Sent: 9/28/2022  10:56 AM CDT  To: [facility] Staff    .Type: Patient Call Back    Who called:self    What is the request in detail: pt was told to give Isabel a call. Please callback    Can the clinic reply by [facility]?    Would the patient rather a call back or a response via [facility]? call    Best call back number:.[phone] (home)

## 2022-10-05 DIAGNOSIS — Z78.0 MENOPAUSE: ICD-10-CM

## 2022-10-17 ENCOUNTER — PATIENT OUTREACH (OUTPATIENT)
Dept: ADMINISTRATIVE | Facility: HOSPITAL | Age: 67
End: 2022-10-17
Payer: COMMERCIAL

## 2022-11-04 ENCOUNTER — TELEPHONE (OUTPATIENT)
Dept: NEUROLOGY | Facility: CLINIC | Age: 67
End: 2022-11-04
Payer: COMMERCIAL

## 2022-11-04 NOTE — TELEPHONE ENCOUNTER
Called pt because pt's appt was cancelled w Dr. Turner due too her dx being general neuro. She was then sent to [facility] for neuropathy. I scheduled her 11/8 at 9:20am. Pt stated that she will have to bring her mother, which she was unhappy about, but agreed to date and time.

## 2022-11-08 ENCOUNTER — OFFICE VISIT (OUTPATIENT)
Dept: NEUROLOGY | Facility: CLINIC | Age: 67
End: 2022-11-08
Payer: COMMERCIAL

## 2022-11-08 VITALS
HEIGHT: 62 IN | WEIGHT: 138.13 LBS | DIASTOLIC BLOOD PRESSURE: 88 MMHG | BODY MASS INDEX: 25.42 KG/M2 | HEART RATE: 75 BPM | SYSTOLIC BLOOD PRESSURE: 166 MMHG

## 2022-11-08 DIAGNOSIS — M54.9 DORSALGIA, UNSPECIFIED: Primary | ICD-10-CM

## 2022-11-08 DIAGNOSIS — R20.2 PARESTHESIAS: ICD-10-CM

## 2022-11-08 PROCEDURE — 3008F BODY MASS INDEX DOCD: CPT | Mod: CPTII,S$GLB,, | Performed by: PSYCHIATRY & NEUROLOGY

## 2022-11-08 PROCEDURE — 99999 PR PBB SHADOW E&M-EST. PATIENT-LVL III: ICD-10-PCS | Mod: PBBFAC,,, | Performed by: PSYCHIATRY & NEUROLOGY

## 2022-11-08 PROCEDURE — 3288F PR FALLS RISK ASSESSMENT DOCUMENTED: ICD-10-PCS | Mod: CPTII,S$GLB,, | Performed by: PSYCHIATRY & NEUROLOGY

## 2022-11-08 PROCEDURE — 99999 PR PBB SHADOW E&M-EST. PATIENT-LVL III: CPT | Mod: PBBFAC,,, | Performed by: PSYCHIATRY & NEUROLOGY

## 2022-11-08 PROCEDURE — 1101F PR PT FALLS ASSESS DOC 0-1 FALLS W/OUT INJ PAST YR: ICD-10-PCS | Mod: CPTII,S$GLB,, | Performed by: PSYCHIATRY & NEUROLOGY

## 2022-11-08 PROCEDURE — 1125F PR PAIN SEVERITY QUANTIFIED, PAIN PRESENT: ICD-10-PCS | Mod: CPTII,S$GLB,, | Performed by: PSYCHIATRY & NEUROLOGY

## 2022-11-08 PROCEDURE — 1125F AMNT PAIN NOTED PAIN PRSNT: CPT | Mod: CPTII,S$GLB,, | Performed by: PSYCHIATRY & NEUROLOGY

## 2022-11-08 PROCEDURE — 3079F PR MOST RECENT DIASTOLIC BLOOD PRESSURE 80-89 MM HG: ICD-10-PCS | Mod: CPTII,S$GLB,, | Performed by: PSYCHIATRY & NEUROLOGY

## 2022-11-08 PROCEDURE — 3288F FALL RISK ASSESSMENT DOCD: CPT | Mod: CPTII,S$GLB,, | Performed by: PSYCHIATRY & NEUROLOGY

## 2022-11-08 PROCEDURE — 1101F PT FALLS ASSESS-DOCD LE1/YR: CPT | Mod: CPTII,S$GLB,, | Performed by: PSYCHIATRY & NEUROLOGY

## 2022-11-08 PROCEDURE — 3079F DIAST BP 80-89 MM HG: CPT | Mod: CPTII,S$GLB,, | Performed by: PSYCHIATRY & NEUROLOGY

## 2022-11-08 PROCEDURE — 3077F PR MOST RECENT SYSTOLIC BLOOD PRESSURE >= 140 MM HG: ICD-10-PCS | Mod: CPTII,S$GLB,, | Performed by: PSYCHIATRY & NEUROLOGY

## 2022-11-08 PROCEDURE — 3044F PR MOST RECENT HEMOGLOBIN A1C LEVEL <7.0%: ICD-10-PCS | Mod: CPTII,S$GLB,, | Performed by: PSYCHIATRY & NEUROLOGY

## 2022-11-08 PROCEDURE — 3077F SYST BP >= 140 MM HG: CPT | Mod: CPTII,S$GLB,, | Performed by: PSYCHIATRY & NEUROLOGY

## 2022-11-08 PROCEDURE — 99204 PR OFFICE/OUTPT VISIT, NEW, LEVL IV, 45-59 MIN: ICD-10-PCS | Mod: S$GLB,,, | Performed by: PSYCHIATRY & NEUROLOGY

## 2022-11-08 PROCEDURE — 3044F HG A1C LEVEL LT 7.0%: CPT | Mod: CPTII,S$GLB,, | Performed by: PSYCHIATRY & NEUROLOGY

## 2022-11-08 PROCEDURE — 1159F PR MEDICATION LIST DOCUMENTED IN MEDICAL RECORD: ICD-10-PCS | Mod: CPTII,S$GLB,, | Performed by: PSYCHIATRY & NEUROLOGY

## 2022-11-08 PROCEDURE — 1159F MED LIST DOCD IN RCRD: CPT | Mod: CPTII,S$GLB,, | Performed by: PSYCHIATRY & NEUROLOGY

## 2022-11-08 PROCEDURE — 99204 OFFICE O/P NEW MOD 45 MIN: CPT | Mod: S$GLB,,, | Performed by: PSYCHIATRY & NEUROLOGY

## 2022-11-08 PROCEDURE — 3008F PR BODY MASS INDEX (BMI) DOCUMENTED: ICD-10-PCS | Mod: CPTII,S$GLB,, | Performed by: PSYCHIATRY & NEUROLOGY

## 2022-11-09 NOTE — PROGRESS NOTES
"  Nan Avina is a 67 y.o. year old female that  presents for evaluation of arms and legs numbness.  Chief Complaint   Patient presents with    Consult    Peripheral Neuropathy     Legs and feet   .     HPI:  Mrs Avina has episodic migraine and osteopenia.  She endorses a long standing history of legs numbness, tingling, and aching pain, as well as more recent onset of numbness both arms that started around September/October when she awoke and her arms were "completely numb". She also reports " significant pain aggravation". Denies arms pain or weakness. No HA, vertigo, double vision, difficulty swallowing, imbalance, falls, slurred speech, language or vision impairment.  Stated that her legs symptoms got better when she used compressive legs stocking.  Recent vascular US BLE showed no evidence of DVT.  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       Past Medical History:   Diagnosis Date    Allergic rhinitis, seasonal 4/18/2016    Fever blister     Migraine headache     Migraine syndrome 4/18/2016    Osteopenia 7/9/2019    Polycystic ovarian syndrome     Vertigo      Social History     Socioeconomic History    Marital status:    Occupational History    Occupation: Unemployed   Tobacco Use    Smoking status: Never    Smokeless tobacco: Never   Substance and Sexual Activity    Alcohol use: Yes     Alcohol/week: 5.0 standard drinks     Types: 5 Glasses of wine per week     Comment: occasional    Drug use: No    Sexual activity: Yes     Partners: Male     Birth control/protection: Surgical     Comment:  since 1976   Social History Narrative    . First grandchild born 9/2012.     Works at a school.      Past Surgical History:   Procedure Laterality Date    BREAST BIOPSY      DILATION AND CURETTAGE OF UTERUS      HYSTERECTOMY  2003    [facility]    INCISIONAL BREAST BIOPSY  2004    OOPHORECTOMY      TONSILLECTOMY, " "ADENOIDECTOMY      Vocal cord nodules       Family History   Problem Relation Age of Onset    Cancer Brother         The patient's brother  from cancer at age 58 unknown source of cancer possible liver    Diabetes Brother     Breast cancer Maternal Aunt     Breast cancer Maternal Aunt     Stroke Father     Coronary artery disease Father     Coronary artery disease Mother     Stroke Mother     Breast cancer Maternal Aunt     Colon cancer Neg Hx     Ovarian cancer Neg Hx     Hypertension Neg Hx            Review of Systems  General ROS: negative for chills, fever or weight loss  Psychological ROS: negative for hallucination, depression or suicidal ideation  Ophthalmic ROS: negative for blurry vision, photophobia or eye pain  ENT ROS: negative for epistaxis, sore throat or rhinorrhea  Respiratory ROS: no cough, shortness of breath, or wheezing  Cardiovascular ROS: no chest pain or dyspnea on exertion  Gastrointestinal ROS: no abdominal pain, change in bowel habits, or black/ bloody stools  Genito-Urinary ROS: no dysuria, trouble voiding, or hematuria  Musculoskeletal ROS: negative for gait disturbance or muscular weakness  Neurological ROS: no syncope or seizures; no ataxia  Dermatological ROS: negative for pruritis, rash and jaundice      Physical Exam:  BP (!) 166/88   Pulse 75   Ht 5' 2" (1.575 m)   Wt 62.6 kg (138 lb 1.9 oz)   BMI 25.26 kg/m²   General appearance: alert, cooperative, no distress  Constitutional:Oriented to person, place, and time.appears well-developed and well-nourished.   HEENT: Normocephalic, atraumatic, neck symmetrical, no nasal discharge   Eyes: conjunctivae/corneas clear, PERRL, EOM's intact  Lungs: clear to auscultation bilaterally, no dullness to percussion bilaterally  Heart: regular rate and rhythm without rub; no displacement of the PMI   Abdomen: soft, non-tender; bowel sounds normoactive; no organomegaly  Extremities: extremities symmetric; no clubbing, cyanosis, or " edema  Integument: Skin color, texture, turgor normal; no rashes; hair distrubution normal  Neurologic:   Mental status: alert and awake, oriented x 4, comprehension, naming, and repetition intact. No right to left confusion. Performs serial 7's without difficulty .No dysarthria.  CN 2-12: pupils 4 mm bilaterally, reactive to light. Fundi without papilledema. Visual fields full to confrontation. EOM full without nystagmus. Face sensation normal in all distributions. Face symmetric. Hearing grossly intact. Palate elevates well. Tongue midline without atrophy or fasciculations.  Motor: 5/5 all over  Sensory: intact in all modalities.  DTR's: 1+ all over.  Plantars: no tested.  Coordination: finger to nose and heel-knee-shin intact.  Gait: no ataxia or bradykinesia         LABS:    Complete Blood Count  Lab Results   Component Value Date    RBC 4.50 04/02/2022    HGB 14.6 04/02/2022    HCT 43.9 04/02/2022    MCV 98 04/02/2022    MCH 32.4 (H) 04/02/2022    MCHC 33.3 04/02/2022    RDW 12.8 04/02/2022     04/02/2022    MPV 9.8 04/02/2022    GRAN 3.8 04/02/2022    GRAN 64.8 04/02/2022    LYMPH 1.3 04/02/2022    LYMPH 22.9 04/02/2022    MONO 0.6 04/02/2022    MONO 9.5 04/02/2022    EOS 0.1 04/02/2022    BASO 0.03 04/02/2022    EOSINOPHIL 2.1 04/02/2022    BASOPHIL 0.5 04/02/2022    DIFFMETHOD Automated 04/02/2022       Comprehensive Metabolic Panel  Lab Results   Component Value Date    GLU 98 04/02/2022    BUN 21 04/02/2022    CREATININE 0.7 04/02/2022     04/02/2022    K 4.1 04/02/2022     04/02/2022    PROT 7.1 04/02/2022    ALBUMIN 3.9 04/02/2022    BILITOT 0.5 04/02/2022    AST 26 04/02/2022    ALKPHOS 71 04/02/2022    CO2 28 04/02/2022    ALT 31 04/02/2022    ANIONGAP 5 (L) 04/02/2022    EGFRNONAA >60 04/02/2022    ESTGFRAFRICA >60 04/02/2022       TSH  Lab Results   Component Value Date    TSH 1.909 04/02/2022         Assessment: 68 y/o with episodic migraine and osteopenia, presents for evaluation  of chronic BLE paresthesias and recent onset of BUE paresthesias.  Neuro exam is unimpressive.  D Dx polyneuropathy vs polyradiculopathy, less likely cord disease.  Plan to obtain MRI cervical spine and electrophysiological testing.            ICD-10-CM ICD-9-CM    1. Dorsalgia, unspecified  M54.9 724.5 MRI Lumbar Spine Without Contrast      2. Paresthesias  R20.2 782.0 MRI Cervical Spine Without Contrast        The primary encounter diagnosis was Dorsalgia, unspecified. A diagnosis of Paresthesias was also pertinent to this visit.      Plan:  1) Paresthesias arms and legs: as above  2) Osteopenia  3) Episodic migraine  4) Situational stress  Orders Placed This Encounter   Procedures    MRI Cervical Spine Without Contrast    MRI Lumbar Spine Without Contrast           John Monterroso MD

## 2022-11-14 ENCOUNTER — HOSPITAL ENCOUNTER (OUTPATIENT)
Dept: RADIOLOGY | Facility: HOSPITAL | Age: 67
Discharge: HOME | End: 2022-11-14
Attending: PSYCHIATRY & NEUROLOGY
Payer: COMMERCIAL

## 2022-11-14 ENCOUNTER — PATIENT MESSAGE (OUTPATIENT)
Dept: NEUROLOGY | Facility: CLINIC | Age: 67
End: 2022-11-14
Payer: COMMERCIAL

## 2022-11-14 DIAGNOSIS — R20.2 PARESTHESIAS: ICD-10-CM

## 2022-11-14 DIAGNOSIS — M54.9 DORSALGIA, UNSPECIFIED: ICD-10-CM

## 2022-11-14 PROCEDURE — 72141 MRI NECK SPINE W/O DYE: CPT | Mod: TC,PO

## 2022-11-14 PROCEDURE — 72148 MRI LUMBAR SPINE WITHOUT CONTRAST: ICD-10-PCS | Mod: 26,,, | Performed by: RADIOLOGY

## 2022-11-14 PROCEDURE — 72141 MRI NECK SPINE W/O DYE: CPT | Mod: 26,,, | Performed by: RADIOLOGY

## 2022-11-14 PROCEDURE — 72148 MRI LUMBAR SPINE W/O DYE: CPT | Mod: 26,,, | Performed by: RADIOLOGY

## 2022-11-14 PROCEDURE — 72141 MRI CERVICAL SPINE WITHOUT CONTRAST: ICD-10-PCS | Mod: 26,,, | Performed by: RADIOLOGY

## 2022-11-14 PROCEDURE — 72148 MRI LUMBAR SPINE W/O DYE: CPT | Mod: TC,PO

## 2022-11-28 ENCOUNTER — HOSPITAL ENCOUNTER (OUTPATIENT)
Dept: RADIOLOGY | Facility: CLINIC | Age: 67
Discharge: HOME | End: 2022-11-28
Attending: INTERNAL MEDICINE
Payer: COMMERCIAL

## 2022-11-28 DIAGNOSIS — Z78.0 MENOPAUSE: ICD-10-CM

## 2022-11-28 PROCEDURE — 77080 DEXA BONE DENSITY SPINE HIP: ICD-10-PCS | Mod: 26,,, | Performed by: INTERNAL MEDICINE

## 2022-11-28 PROCEDURE — 77080 DXA BONE DENSITY AXIAL: CPT | Mod: 26,,, | Performed by: INTERNAL MEDICINE

## 2022-11-28 PROCEDURE — 77080 DXA BONE DENSITY AXIAL: CPT | Mod: TC,PO

## 2023-06-14 ENCOUNTER — TELEPHONE (OUTPATIENT)
Dept: OBSTETRICS AND GYNECOLOGY | Facility: CLINIC | Age: 68
End: 2023-06-14
Payer: COMMERCIAL

## 2023-06-16 ENCOUNTER — PATIENT MESSAGE (OUTPATIENT)
Dept: OBSTETRICS AND GYNECOLOGY | Facility: CLINIC | Age: 68
End: 2023-06-16
Payer: COMMERCIAL

## 2023-06-16 ENCOUNTER — HOSPITAL ENCOUNTER (OUTPATIENT)
Dept: RADIOLOGY | Facility: HOSPITAL | Age: 68
Discharge: HOME | End: 2023-06-16
Attending: OBSTETRICS & GYNECOLOGY
Payer: COMMERCIAL

## 2023-06-16 DIAGNOSIS — Z12.31 SCREENING MAMMOGRAM, ENCOUNTER FOR: ICD-10-CM

## 2023-06-16 PROCEDURE — 77063 MAMMO DIGITAL SCREENING BILAT WITH TOMO: ICD-10-PCS | Mod: 26,,, | Performed by: RADIOLOGY

## 2023-06-16 PROCEDURE — 77063 BREAST TOMOSYNTHESIS BI: CPT | Mod: 26,,, | Performed by: RADIOLOGY

## 2023-06-16 PROCEDURE — 77067 SCR MAMMO BI INCL CAD: CPT | Mod: 26,,, | Performed by: RADIOLOGY

## 2023-06-16 PROCEDURE — 77067 MAMMO DIGITAL SCREENING BILAT WITH TOMO: ICD-10-PCS | Mod: 26,,, | Performed by: RADIOLOGY

## 2023-06-16 PROCEDURE — 77067 SCR MAMMO BI INCL CAD: CPT | Mod: TC,PO

## 2023-06-20 NOTE — TELEPHONE ENCOUNTER
No care due was identified.  [facility] Embedded Care Due Messages. Reference number: [id number].   6/20/2023 10:20:58 AM CDT

## 2023-06-24 RX ORDER — OMEPRAZOLE 40 MG/1
CAPSULE, DELAYED RELEASE ORAL
Qty: 30 CAPSULE | Refills: 12 | Status: SHIPPED | OUTPATIENT
Start: 2023-06-24 | End: 2023-11-03 | Stop reason: SDUPTHER

## 2023-07-20 ENCOUNTER — PATIENT OUTREACH (OUTPATIENT)
Dept: ADMINISTRATIVE | Facility: HOSPITAL | Age: 68
End: 2023-07-20
Payer: COMMERCIAL

## 2023-09-06 DIAGNOSIS — E89.40 POSTSURGICAL MENOPAUSE: ICD-10-CM

## 2023-09-06 RX ORDER — ESTRADIOL 0.03 MG/D
1 FILM, EXTENDED RELEASE TRANSDERMAL
Qty: 8 PATCH | Refills: 1 | Status: SHIPPED | OUTPATIENT
Start: 2023-09-07 | End: 2023-09-11

## 2023-09-06 NOTE — TELEPHONE ENCOUNTER
----- Message from Mariam Herrera sent at 9/6/2023 10:27 AM CDT -----  .Type: RX Refill Request    Who Called: Self     Have you contacted your pharmacy: Yes     Refill or New Rx: New Rx     RX Name and Strength:estradioL (VIVELLE-DOT) 0.025 mg/24 hr    Preferred Pharmacy with phone number:.  [id number] - [address]  [address] [employer] (Inspect Manager)[address] [facility] [address] [place] [address]  Phone: [phone] Fax: [phone]        Local or Mail Order: Local     Ordering Provider: Sanchez    Would the patient rather a call back or a response via [facility]? Call Back     Best Call Back Number: .[phone] (home)       Additional Information:

## 2023-09-11 ENCOUNTER — OFFICE VISIT (OUTPATIENT)
Dept: OBSTETRICS AND GYNECOLOGY | Facility: CLINIC | Age: 68
End: 2023-09-11
Payer: COMMERCIAL

## 2023-09-11 VITALS
DIASTOLIC BLOOD PRESSURE: 75 MMHG | WEIGHT: 130.06 LBS | BODY MASS INDEX: 23.93 KG/M2 | HEIGHT: 62 IN | SYSTOLIC BLOOD PRESSURE: 134 MMHG

## 2023-09-11 DIAGNOSIS — Z01.419 ENCOUNTER FOR GYNECOLOGICAL EXAMINATION WITHOUT ABNORMAL FINDING: Primary | ICD-10-CM

## 2023-09-11 DIAGNOSIS — E89.40 POSTSURGICAL MENOPAUSE: ICD-10-CM

## 2023-09-11 DIAGNOSIS — N95.1 MENOPAUSAL SYNDROME: ICD-10-CM

## 2023-09-11 PROCEDURE — 1101F PR PT FALLS ASSESS DOC 0-1 FALLS W/OUT INJ PAST YR: ICD-10-PCS | Mod: CPTII,S$GLB,, | Performed by: OBSTETRICS & GYNECOLOGY

## 2023-09-11 PROCEDURE — 3288F FALL RISK ASSESSMENT DOCD: CPT | Mod: CPTII,S$GLB,, | Performed by: OBSTETRICS & GYNECOLOGY

## 2023-09-11 PROCEDURE — 3078F PR MOST RECENT DIASTOLIC BLOOD PRESSURE < 80 MM HG: ICD-10-PCS | Mod: CPTII,S$GLB,, | Performed by: OBSTETRICS & GYNECOLOGY

## 2023-09-11 PROCEDURE — 1159F MED LIST DOCD IN RCRD: CPT | Mod: CPTII,S$GLB,, | Performed by: OBSTETRICS & GYNECOLOGY

## 2023-09-11 PROCEDURE — 3075F PR MOST RECENT SYSTOLIC BLOOD PRESS GE 130-139MM HG: ICD-10-PCS | Mod: CPTII,S$GLB,, | Performed by: OBSTETRICS & GYNECOLOGY

## 2023-09-11 PROCEDURE — 1160F PR REVIEW ALL MEDS BY PRESCRIBER/CLIN PHARMACIST DOCUMENTED: ICD-10-PCS | Mod: CPTII,S$GLB,, | Performed by: OBSTETRICS & GYNECOLOGY

## 2023-09-11 PROCEDURE — 1101F PT FALLS ASSESS-DOCD LE1/YR: CPT | Mod: CPTII,S$GLB,, | Performed by: OBSTETRICS & GYNECOLOGY

## 2023-09-11 PROCEDURE — 99999 PR PBB SHADOW E&M-EST. PATIENT-LVL III: CPT | Mod: PBBFAC,,, | Performed by: OBSTETRICS & GYNECOLOGY

## 2023-09-11 PROCEDURE — 99999 PR PBB SHADOW E&M-EST. PATIENT-LVL III: ICD-10-PCS | Mod: PBBFAC,,, | Performed by: OBSTETRICS & GYNECOLOGY

## 2023-09-11 PROCEDURE — 1126F PR PAIN SEVERITY QUANTIFIED, NO PAIN PRESENT: ICD-10-PCS | Mod: CPTII,S$GLB,, | Performed by: OBSTETRICS & GYNECOLOGY

## 2023-09-11 PROCEDURE — 1159F PR MEDICATION LIST DOCUMENTED IN MEDICAL RECORD: ICD-10-PCS | Mod: CPTII,S$GLB,, | Performed by: OBSTETRICS & GYNECOLOGY

## 2023-09-11 PROCEDURE — 1126F AMNT PAIN NOTED NONE PRSNT: CPT | Mod: CPTII,S$GLB,, | Performed by: OBSTETRICS & GYNECOLOGY

## 2023-09-11 PROCEDURE — 3008F BODY MASS INDEX DOCD: CPT | Mod: CPTII,S$GLB,, | Performed by: OBSTETRICS & GYNECOLOGY

## 2023-09-11 PROCEDURE — 99397 PR PREVENTIVE VISIT,EST,65 & OVER: ICD-10-PCS | Mod: S$GLB,,, | Performed by: OBSTETRICS & GYNECOLOGY

## 2023-09-11 PROCEDURE — 3075F SYST BP GE 130 - 139MM HG: CPT | Mod: CPTII,S$GLB,, | Performed by: OBSTETRICS & GYNECOLOGY

## 2023-09-11 PROCEDURE — 3288F PR FALLS RISK ASSESSMENT DOCUMENTED: ICD-10-PCS | Mod: CPTII,S$GLB,, | Performed by: OBSTETRICS & GYNECOLOGY

## 2023-09-11 PROCEDURE — 3008F PR BODY MASS INDEX (BMI) DOCUMENTED: ICD-10-PCS | Mod: CPTII,S$GLB,, | Performed by: OBSTETRICS & GYNECOLOGY

## 2023-09-11 PROCEDURE — 3078F DIAST BP <80 MM HG: CPT | Mod: CPTII,S$GLB,, | Performed by: OBSTETRICS & GYNECOLOGY

## 2023-09-11 PROCEDURE — 1160F RVW MEDS BY RX/DR IN RCRD: CPT | Mod: CPTII,S$GLB,, | Performed by: OBSTETRICS & GYNECOLOGY

## 2023-09-11 PROCEDURE — 99397 PER PM REEVAL EST PAT 65+ YR: CPT | Mod: S$GLB,,, | Performed by: OBSTETRICS & GYNECOLOGY

## 2023-09-11 RX ORDER — ESTRADIOL 0.03 MG/D
1 FILM, EXTENDED RELEASE TRANSDERMAL
Qty: 24 PATCH | Refills: 3 | Status: SHIPPED | OUTPATIENT
Start: 2023-09-11

## 2023-09-11 NOTE — PROGRESS NOTES
Subjective:       Patient ID: Nan Avina is a 68 y.o. female.    Chief Complaint:  Annual Exam      History of Present Illness  HPI  Nan Avina is a 68 y.o. female  here for her annual GYN exam.   She is doing well on her Vivelle dot patch and no longer has headaches since her dose was decreased. Wants to continue on it. No longer has vasomotor symptoms, wants to continue it..  She has been greatly stressed due to caring for her 100 year old mother as well as multiple other family issues.   denies vaginal itching or irritation.  Denies vaginal discharge.  She is sexually active. She has had1 partner for 47 years .   History of abnormal pap: No  Last Pap:  had hysterectomy  Last MMG: normal--routine follow-up in 12 months  Last Colonoscopy:  had FiTT  denies domestic violence. She does feel safe at home.     Past Medical History:   Diagnosis Date    Allergic rhinitis, seasonal 2016    Fever blister     Migraine headache     Migraine syndrome 2016    Osteopenia 2019    Polycystic ovarian syndrome     Vertigo      Past Surgical History:   Procedure Laterality Date    BREAST BIOPSY      DILATION AND CURETTAGE OF UTERUS      HYSTERECTOMY  2003    [facility]    INCISIONAL BREAST BIOPSY  2004    OOPHORECTOMY      TONSILLECTOMY, ADENOIDECTOMY      Vocal cord nodules       Social History     Socioeconomic History    Marital status:    Occupational History    Occupation: Unemployed   Tobacco Use    Smoking status: Never    Smokeless tobacco: Never   Substance and Sexual Activity    Alcohol use: Yes     Alcohol/week: 5.0 standard drinks of alcohol     Types: 5 Glasses of wine per week     Comment: occasional    Drug use: No    Sexual activity: Yes     Partners: Male     Birth control/protection: Surgical     Comment:  since    Social History Narrative    . First grandchild born 2012.     Works at a school.      Family History   Problem Relation Age of Onset     "Cancer Brother         The patient's brother  from cancer at age 58 unknown source of cancer possible liver    Diabetes Brother     Breast cancer Maternal Aunt     Breast cancer Maternal Aunt     Stroke Father     Coronary artery disease Father     Coronary artery disease Mother     Stroke Mother     Breast cancer Maternal Aunt     Colon cancer Neg Hx     Ovarian cancer Neg Hx     Hypertension Neg Hx      OB History          3    Para   3    Term   3            AB        Living   3         SAB        IAB        Ectopic        Multiple        Live Births   3                 /75   Ht 5' 2" (1.575 m)   Wt 59 kg (130 lb 1.1 oz)   BMI 23.79 kg/m²         GYN & OB History    Date of Last Pap: No result found    OB History    Para Term  AB Living   3 3 3     3   SAB IAB Ectopic Multiple Live Births           3      # Outcome Date GA Lbr Hang/2nd Weight Sex Delivery Anes PTL Lv   3 Term      Vag-Spont   ISAI   2 Term      Vag-Spont   ISAI   1 Term      Vag-Spont   ISAI       Review of Systems  Review of Systems   Constitutional:  Negative for activity change, appetite change, fatigue and unexpected weight change.   HENT: Negative.     Eyes:  Negative for visual disturbance.   Respiratory:  Negative for shortness of breath and wheezing.    Cardiovascular:  Negative for chest pain, palpitations and leg swelling.   Gastrointestinal:  Negative for abdominal pain, bloating and blood in stool.   Endocrine: Negative for diabetes and hair loss.   Genitourinary:  Negative for decreased libido, dyspareunia, hot flashes and vaginal dryness.   Musculoskeletal:  Negative for back pain and joint swelling.   Integumentary:  Negative for acne, hair changes and nipple discharge.   Neurological:  Negative for headaches.   Hematological:  Does not bruise/bleed easily.   Psychiatric/Behavioral:  Negative for depression and sleep disturbance. The patient is not nervous/anxious.    Breast: Negative for " mastodynia and nipple discharge          Objective:      Physical Exam:   Constitutional: She is oriented to person, place, and time. She appears well-developed and well-nourished.    HENT:   Head: Normocephalic and atraumatic.    Eyes: Pupils are equal, round, and reactive to light. EOM are normal.     Cardiovascular:  Normal rate and regular rhythm.             Pulmonary/Chest: Effort normal and breath sounds normal.   BREASTS:  no mass, no tenderness, no deformity and no retraction. Right breast exhibits no inverted nipple, no mass, no nipple discharge, no skin change, no tenderness, no bleeding and no swelling. Left breast exhibits no inverted nipple, no mass, no nipple discharge, no skin change, no tenderness, no bleeding and no swelling. Breasts are symmetrical.              Abdominal: Soft. Bowel sounds are normal.     Genitourinary:    Pelvic exam was performed with patient supine.      Genitourinary Comments: PELVIC: Normal external female genitalia without lesions. Normal hair distribution. Adequate perineal body, normal urethral meatus. Vagina atrophic without lesions or discharge. Secondr degree cystocele and rectocele. Bimanual exam shows uterus and cervix to be surgically absent. Adnexa without masses or tenderness.  RECTAL: Deferred                 Musculoskeletal: Normal range of motion and moves all extremeties.       Neurological: She is alert and oriented to person, place, and time.    Skin: Skin is warm and dry.    Psychiatric: She has a normal mood and affect.              Assessment:        1. Encounter for gynecological examination without abnormal finding    2. Menopausal syndrome    3. Postsurgical menopause                Plan:      Problem List Items Addressed This Visit    None  Visit Diagnoses       Encounter for gynecological examination without abnormal finding    -  Primary  COUNSELING:  The patient was counseled today on regular weight bearing exercise. Patient was counseled today on  the new ACS guidelines for cervical cytology screening as well as the current recommendations for breast cancer screening. Counseling session lasted approximately 10 minutes, and all her questions were answered. She was advised to see her primary care physician for all other health maintenance.   FOLLOW-UP with me for next routine visit.       Menopausal syndrome        Postsurgical menopause        Relevant Medications    estradioL (VIVELLE-DOT) 0.025 mg/24 hr             Follow up in about 1 year (around 9/11/2024).

## 2023-11-03 ENCOUNTER — OFFICE VISIT (OUTPATIENT)
Dept: FAMILY MEDICINE | Facility: CLINIC | Age: 68
End: 2023-11-03
Payer: COMMERCIAL

## 2023-11-03 VITALS
WEIGHT: 130.94 LBS | HEART RATE: 77 BPM | BODY MASS INDEX: 24.09 KG/M2 | RESPIRATION RATE: 18 BRPM | SYSTOLIC BLOOD PRESSURE: 130 MMHG | OXYGEN SATURATION: 97 % | DIASTOLIC BLOOD PRESSURE: 70 MMHG | TEMPERATURE: 98 F | HEIGHT: 62 IN

## 2023-11-03 DIAGNOSIS — Z12.12 SCREENING FOR COLORECTAL CANCER: ICD-10-CM

## 2023-11-03 DIAGNOSIS — Z12.31 ENCOUNTER FOR SCREENING MAMMOGRAM FOR BREAST CANCER: ICD-10-CM

## 2023-11-03 DIAGNOSIS — E55.9 VITAMIN D DEFICIENCY DISEASE: ICD-10-CM

## 2023-11-03 DIAGNOSIS — I83.93 VARICOSE VEINS OF BOTH LOWER EXTREMITIES, UNSPECIFIED WHETHER COMPLICATED: ICD-10-CM

## 2023-11-03 DIAGNOSIS — Z12.11 SCREENING FOR COLORECTAL CANCER: ICD-10-CM

## 2023-11-03 DIAGNOSIS — K21.9 GASTROESOPHAGEAL REFLUX DISEASE, UNSPECIFIED WHETHER ESOPHAGITIS PRESENT: ICD-10-CM

## 2023-11-03 DIAGNOSIS — D75.89 MACROCYTIC: ICD-10-CM

## 2023-11-03 DIAGNOSIS — Z00.00 ROUTINE MEDICAL EXAM: Primary | ICD-10-CM

## 2023-11-03 DIAGNOSIS — R79.89 ABNORMAL LIVER FUNCTION TESTS: ICD-10-CM

## 2023-11-03 DIAGNOSIS — G43.909 MIGRAINE SYNDROME: ICD-10-CM

## 2023-11-03 DIAGNOSIS — M85.80 OSTEOPENIA, UNSPECIFIED LOCATION: ICD-10-CM

## 2023-11-03 DIAGNOSIS — I87.2 VENOUS INSUFFICIENCY: ICD-10-CM

## 2023-11-03 DIAGNOSIS — J30.2 SEASONAL ALLERGIC RHINITIS, UNSPECIFIED TRIGGER: ICD-10-CM

## 2023-11-03 DIAGNOSIS — R14.2 BELCHING: ICD-10-CM

## 2023-11-03 PROCEDURE — 99999 PR PBB SHADOW E&M-EST. PATIENT-LVL IV: CPT | Mod: PBBFAC,,, | Performed by: INTERNAL MEDICINE

## 2023-11-03 PROCEDURE — 3008F PR BODY MASS INDEX (BMI) DOCUMENTED: ICD-10-PCS | Mod: CPTII,S$GLB,, | Performed by: INTERNAL MEDICINE

## 2023-11-03 PROCEDURE — 3288F PR FALLS RISK ASSESSMENT DOCUMENTED: ICD-10-PCS | Mod: CPTII,S$GLB,, | Performed by: INTERNAL MEDICINE

## 2023-11-03 PROCEDURE — 1101F PR PT FALLS ASSESS DOC 0-1 FALLS W/OUT INJ PAST YR: ICD-10-PCS | Mod: CPTII,S$GLB,, | Performed by: INTERNAL MEDICINE

## 2023-11-03 PROCEDURE — 1101F PT FALLS ASSESS-DOCD LE1/YR: CPT | Mod: CPTII,S$GLB,, | Performed by: INTERNAL MEDICINE

## 2023-11-03 PROCEDURE — 3078F PR MOST RECENT DIASTOLIC BLOOD PRESSURE < 80 MM HG: ICD-10-PCS | Mod: CPTII,S$GLB,, | Performed by: INTERNAL MEDICINE

## 2023-11-03 PROCEDURE — 3075F SYST BP GE 130 - 139MM HG: CPT | Mod: CPTII,S$GLB,, | Performed by: INTERNAL MEDICINE

## 2023-11-03 PROCEDURE — 99999 PR PBB SHADOW E&M-EST. PATIENT-LVL IV: ICD-10-PCS | Mod: PBBFAC,,, | Performed by: INTERNAL MEDICINE

## 2023-11-03 PROCEDURE — 3078F DIAST BP <80 MM HG: CPT | Mod: CPTII,S$GLB,, | Performed by: INTERNAL MEDICINE

## 2023-11-03 PROCEDURE — 3075F PR MOST RECENT SYSTOLIC BLOOD PRESS GE 130-139MM HG: ICD-10-PCS | Mod: CPTII,S$GLB,, | Performed by: INTERNAL MEDICINE

## 2023-11-03 PROCEDURE — 99397 PR PREVENTIVE VISIT,EST,65 & OVER: ICD-10-PCS | Mod: S$GLB,,, | Performed by: INTERNAL MEDICINE

## 2023-11-03 PROCEDURE — 1159F PR MEDICATION LIST DOCUMENTED IN MEDICAL RECORD: ICD-10-PCS | Mod: CPTII,S$GLB,, | Performed by: INTERNAL MEDICINE

## 2023-11-03 PROCEDURE — 1159F MED LIST DOCD IN RCRD: CPT | Mod: CPTII,S$GLB,, | Performed by: INTERNAL MEDICINE

## 2023-11-03 PROCEDURE — 1126F AMNT PAIN NOTED NONE PRSNT: CPT | Mod: CPTII,S$GLB,, | Performed by: INTERNAL MEDICINE

## 2023-11-03 PROCEDURE — 3288F FALL RISK ASSESSMENT DOCD: CPT | Mod: CPTII,S$GLB,, | Performed by: INTERNAL MEDICINE

## 2023-11-03 PROCEDURE — 3008F BODY MASS INDEX DOCD: CPT | Mod: CPTII,S$GLB,, | Performed by: INTERNAL MEDICINE

## 2023-11-03 PROCEDURE — 99397 PER PM REEVAL EST PAT 65+ YR: CPT | Mod: S$GLB,,, | Performed by: INTERNAL MEDICINE

## 2023-11-03 PROCEDURE — 1126F PR PAIN SEVERITY QUANTIFIED, NO PAIN PRESENT: ICD-10-PCS | Mod: CPTII,S$GLB,, | Performed by: INTERNAL MEDICINE

## 2023-11-03 RX ORDER — OMEPRAZOLE 40 MG/1
40 CAPSULE, DELAYED RELEASE ORAL DAILY
Qty: 90 CAPSULE | Refills: 12 | Status: SHIPPED | OUTPATIENT
Start: 2023-11-03

## 2023-11-03 NOTE — PROGRESS NOTES
Chief complaint  physical exam and other issues    68-year-old white female. mammo .  Again discussed colon cancer screening.  She has a colo guard just did not want to do appear to explain how easy it is in some other masses by which he can make it easier but they really is not much excuse to do it if not wanting to pursue a colonoscopy.  I provided her with a new sample kit  - apparently she sent it in but then was told by the company that the order was  so will put in another order and she is willing to do it.    She does have some outside bone densities done by her gyn.  She has had 3 of them.  The last 1 was  and it did show some mild osteopenia in the spine and hips.      Lots of stress caring for her mom.  She is the only caregiver.  Occasionally she gets some tightness around her torso sometimes left and sometimes the right.  We discussed this sounds very much like muscle overuse and associated spasm and it could be her pulling up on her mother and so forth as a potential cause.  She probably needs to apply heat and a hot shower every morning so that her daily activities will not cause recurrent muscle injury...  Her   had six cardiac bypass in 2022 and that caused some increased stress    Occasionally her heart will beat fast and all occurs while at rest.  It has been ongoing for years and only lasts a few seconds.  She will pay attention to see if it is regular or irregular and how frequent.  If frequent enough we could try catching it on a Holter monitor.  She has no exertional chest pains or shortness of breath that would warrant any ischemic workup at this time.    Less headaches since her gyn is reducing her the dose of her hormone replacement patch     she had uncontrollable belching every night a couple of hours after she went to sleep she would awake with it.  She had some shortness of breath and her chest with it and explained that was probably her version of  reflux and heartburn.  She would use Tums and it will be better.  She was set up and it would be better.  She only used her omeprazole when she was taking Advil I discussed and reassured she can use it all the time and I will likely help the situation. Burping still there but better w PPI, sits up and burps to relieve CP. Awakes in 2-3 hrs burping, last year was all day long.  Since putting her on the omeprazole 40 mg it is definitely less frequent and severe but still present.  She has to care for her mom so really had difficulty taking time off for things such as a colonoscopy so similar with an EGD so will at least order an upper GI to get an idea if she has any anatomic issues related to the burping such as a hiatal hernia and so forth.    She did see vascular.  She wore the stockings in her lower extremity pain is improved.  Encouraged her to continue to wear the stockings when needed.  Seen vasc 9/22  -recommend compression with Rx stockings, elevation, dietary changes associated with water and sodium intake discussed at length with patient  -Exercise   -rec L SSV EVLT  -Future BLE sclerotherapy  -RTC 3 months for further evaluation        ROS:   CONST: weight stable. EYES: no vision change. ENT: no sore throat. CV: no chest pain w/ exertion. RESP: no shortness of breath. GI: no nausea, vomiting, diarrhea. No dysphagia. : no urinary issues. MUSCULOSKELETAL: no new myalgias or arthralgias. SKIN: no new changes. NEURO: no focal deficits. PSYCH: no new issues. ENDOCRINE: no polyuria. HEME: no lymph nodes. ALLERGY: no general pruritis.      Past medical history :  1.  Hypertension ???  2.  Migraines  3.  Fever blisters  4.  ALLERGIES  5.  Scalp dermatitis  6. Normal colonoscopy in past  7. Nl BMD in past per Gyn      Past Surgical History   Procedure Laterality Date    Tonsillectomy, adenoidectomy      Breast biopsy      Oophorectomy      Dilation and curettage of uterus      Vocal cord nodules      Incisional  breast biopsy  2004    Hysterectomy  2003     [facility]       Social History     Socioeconomic History    Marital status:     Number of children: 3   Occupational History    Occupation: Unemployed   Tobacco Use    Smoking status: Never     Passive exposure: Never    Smokeless tobacco: Never   Substance and Sexual Activity    Alcohol use: Yes     Alcohol/week: 5.0 standard drinks of alcohol     Types: 5 Glasses of wine per week     Comment: occasional    Drug use: No    Sexual activity: Yes     Partners: Male     Birth control/protection: Surgical     Comment:  since 1976   Social History Narrative    . First grandchild born 9/2012.     Works at a school.      family history includes Breast cancer in her maternal aunt, maternal aunt, and maternal aunt; Cancer in her brother; Coronary artery disease in her father and mother; Diabetes in her brother; Stroke in her father and mother.      Vital signs as above  Gen: no distress  EYES: conjunctiva clear, non-icteric, PERRL, no nystagmus  ENT: nose clear, nasal mucosa normal, oropharynx clear and moist, teeth good  NECK:supple, thyroid non-palpable  RESP: effort is good, lungs clear  CV: heart RRR w/o murmur, gallops or rubs; no carotid bruits, no edema  GI: abdomen soft, non-distended, non-tender, no hepatosplenomegaly  MS: gait normal, no clubbing or cyanosis of the digits, no reproducible chest wall tenderness of the muscles or squeezing the ribs  SKIN: no rashes, warm to touch    Nan was seen today for follow-up.    Diagnoses and all orders for this visit:    Routine medical exam, up-to-date on mammogram, again try to pursue Cologuard  -     Comprehensive Metabolic Panel; Future  -     Vitamin D; Future  -     TSH; Future  -     Lipid Panel; Future  -     CBC Auto Differential; Future    Encounter for screening mammogram for breast cancer    Screening for colorectal cancer  -     Cologuard Screening (Multitarget Stool DNA); Future  -     Cologuard  Screening (Multitarget Stool DNA)    Gastroesophageal reflux disease, unspecified whether esophagitis present, reflux and chronic belching, upper GI to assess anatomical issues, some response to PPI  -     FL Upper GI; Future    Abnormal liver function tests, reassess    Macrocytic, reassess    Osteopenia, unspecified location    Vitamin D deficiency disease, reassess    Venous insufficiency, improved with stockings    Varicose veins of both lower extremities, unspecified whether complicated    Seasonal allergic rhinitis, unspecified trigger, chronic and stable    Migraine syndrome, chronic and stable    Belching  -     FL Upper GI; Future    Other orders  -     omeprazole (PRILOSEC) 40 MG capsule; Take 1 capsule (40 mg total) by mouth once daily.

## 2023-11-09 ENCOUNTER — PATIENT MESSAGE (OUTPATIENT)
Dept: FAMILY MEDICINE | Facility: CLINIC | Age: 68
End: 2023-11-09
Payer: COMMERCIAL

## 2023-11-10 ENCOUNTER — LAB VISIT (OUTPATIENT)
Dept: LAB | Facility: HOSPITAL | Age: 68
End: 2023-11-10
Attending: INTERNAL MEDICINE
Payer: COMMERCIAL

## 2023-11-10 DIAGNOSIS — Z00.00 ROUTINE MEDICAL EXAM: ICD-10-CM

## 2023-11-10 LAB
25(OH)D3+25(OH)D2 SERPL-MCNC: 41 NG/ML (ref 30–96)
ALBUMIN SERPL BCP-MCNC: 3.9 G/DL (ref 3.5–5.2)
ALP SERPL-CCNC: 75 U/L (ref 55–135)
ALT SERPL W/O P-5'-P-CCNC: 25 U/L (ref 10–44)
ANION GAP SERPL CALC-SCNC: 10 MMOL/L (ref 8–16)
AST SERPL-CCNC: 22 U/L (ref 10–40)
BASOPHILS # BLD AUTO: 0.04 K/UL (ref 0–0.2)
BASOPHILS NFR BLD: 0.6 % (ref 0–1.9)
BILIRUB SERPL-MCNC: 0.4 MG/DL (ref 0.1–1)
BUN SERPL-MCNC: 18 MG/DL (ref 8–23)
CALCIUM SERPL-MCNC: 9.3 MG/DL (ref 8.7–10.5)
CHLORIDE SERPL-SCNC: 103 MMOL/L (ref 95–110)
CHOLEST SERPL-MCNC: 203 MG/DL (ref 120–199)
CHOLEST/HDLC SERPL: 3.7 {RATIO} (ref 2–5)
CO2 SERPL-SCNC: 26 MMOL/L (ref 23–29)
CREAT SERPL-MCNC: 0.7 MG/DL (ref 0.5–1.4)
DIFFERENTIAL METHOD: NORMAL
EOSINOPHIL # BLD AUTO: 0.2 K/UL (ref 0–0.5)
EOSINOPHIL NFR BLD: 2.1 % (ref 0–8)
ERYTHROCYTE [DISTWIDTH] IN BLOOD BY AUTOMATED COUNT: 13 % (ref 11.5–14.5)
EST. GFR  (NO RACE VARIABLE): >60 ML/MIN/1.73 M^2
GLUCOSE SERPL-MCNC: 98 MG/DL (ref 70–110)
HCT VFR BLD AUTO: 43.3 % (ref 37–48.5)
HDLC SERPL-MCNC: 55 MG/DL (ref 40–75)
HDLC SERPL: 27.1 % (ref 20–50)
HGB BLD-MCNC: 13.9 G/DL (ref 12–16)
IMM GRANULOCYTES # BLD AUTO: 0.01 K/UL (ref 0–0.04)
IMM GRANULOCYTES NFR BLD AUTO: 0.1 % (ref 0–0.5)
LDLC SERPL CALC-MCNC: 132 MG/DL (ref 63–159)
LYMPHOCYTES # BLD AUTO: 2.1 K/UL (ref 1–4.8)
LYMPHOCYTES NFR BLD: 28.9 % (ref 18–48)
MCH RBC QN AUTO: 30.6 PG (ref 27–31)
MCHC RBC AUTO-ENTMCNC: 32.1 G/DL (ref 32–36)
MCV RBC AUTO: 95 FL (ref 82–98)
MONOCYTES # BLD AUTO: 0.6 K/UL (ref 0.3–1)
MONOCYTES NFR BLD: 8.7 % (ref 4–15)
NEUTROPHILS # BLD AUTO: 4.2 K/UL (ref 1.8–7.7)
NEUTROPHILS NFR BLD: 59.6 % (ref 38–73)
NONHDLC SERPL-MCNC: 148 MG/DL
NRBC BLD-RTO: 0 /100 WBC
PLATELET # BLD AUTO: 345 K/UL (ref 150–450)
PMV BLD AUTO: 10.3 FL (ref 9.2–12.9)
POTASSIUM SERPL-SCNC: 4.2 MMOL/L (ref 3.5–5.1)
PROT SERPL-MCNC: 6.9 G/DL (ref 6–8.4)
RBC # BLD AUTO: 4.54 M/UL (ref 4–5.4)
SODIUM SERPL-SCNC: 139 MMOL/L (ref 136–145)
TRIGL SERPL-MCNC: 80 MG/DL (ref 30–150)
TSH SERPL DL<=0.005 MIU/L-ACNC: 3.11 UIU/ML (ref 0.4–4)
WBC # BLD AUTO: 7.09 K/UL (ref 3.9–12.7)

## 2023-11-10 PROCEDURE — 80053 COMPREHEN METABOLIC PANEL: CPT | Performed by: INTERNAL MEDICINE

## 2023-11-10 PROCEDURE — 82306 VITAMIN D 25 HYDROXY: CPT | Performed by: INTERNAL MEDICINE

## 2023-11-10 PROCEDURE — 84443 ASSAY THYROID STIM HORMONE: CPT | Performed by: INTERNAL MEDICINE

## 2023-11-10 PROCEDURE — 36415 COLL VENOUS BLD VENIPUNCTURE: CPT | Mod: PO | Performed by: INTERNAL MEDICINE

## 2023-11-10 PROCEDURE — 85025 COMPLETE CBC W/AUTO DIFF WBC: CPT | Performed by: INTERNAL MEDICINE

## 2023-11-10 PROCEDURE — 80061 LIPID PANEL: CPT | Performed by: INTERNAL MEDICINE

## 2023-11-14 ENCOUNTER — TELEPHONE (OUTPATIENT)
Dept: OBSTETRICS AND GYNECOLOGY | Facility: CLINIC | Age: 68
End: 2023-11-14
Payer: COMMERCIAL

## 2023-11-14 NOTE — TELEPHONE ENCOUNTER
Called [facility] to check on patients refills order by Dr Bradford on 09/11/2023 fir the year, Pharmacist states yes patient still has refills and will contact patient once script has been refilled.

## 2023-11-16 ENCOUNTER — HOSPITAL ENCOUNTER (OUTPATIENT)
Dept: RADIOLOGY | Facility: HOSPITAL | Age: 68
Discharge: HOME | End: 2023-11-16
Attending: INTERNAL MEDICINE
Payer: COMMERCIAL

## 2023-11-16 DIAGNOSIS — K21.9 GASTROESOPHAGEAL REFLUX DISEASE, UNSPECIFIED WHETHER ESOPHAGITIS PRESENT: ICD-10-CM

## 2023-11-16 DIAGNOSIS — R14.2 BELCHING: ICD-10-CM

## 2023-11-16 PROCEDURE — 74240 FL UPPER GI: ICD-10-PCS | Mod: 26,,, | Performed by: RADIOLOGY

## 2023-11-16 PROCEDURE — 74240 X-RAY XM UPR GI TRC 1CNTRST: CPT | Mod: 26,,, | Performed by: RADIOLOGY

## 2023-11-16 PROCEDURE — 74240 X-RAY XM UPR GI TRC 1CNTRST: CPT | Mod: TC,FY

## 2023-11-16 PROCEDURE — A9698 NON-RAD CONTRAST MATERIALNOC: HCPCS | Performed by: INTERNAL MEDICINE

## 2023-11-16 PROCEDURE — 25500020 PHARM REV CODE 255: Performed by: INTERNAL MEDICINE

## 2023-11-16 RX ADMIN — BARIUM SULFATE 113 ML: 980 POWDER, FOR SUSPENSION ORAL at 09:11

## 2023-11-16 RX ADMIN — BARIUM SULFATE 178 ML: 0.6 SUSPENSION ORAL at 09:11

## 2023-12-29 DIAGNOSIS — B00.89 RECURRENT ORAL HERPES SIMPLEX INFECTION: ICD-10-CM

## 2023-12-29 RX ORDER — VALACYCLOVIR HYDROCHLORIDE 1 G/1
TABLET, FILM COATED ORAL
Qty: 30 TABLET | Refills: 12 | Status: SHIPPED | OUTPATIENT
Start: 2023-12-29

## 2023-12-29 NOTE — TELEPHONE ENCOUNTER
No care due was identified.  [facility] Embedded Care Due Messages. Reference number: [id number].   12/29/2023 2:07:15 PM CST

## 2024-02-21 ENCOUNTER — PATIENT MESSAGE (OUTPATIENT)
Dept: FAMILY MEDICINE | Facility: CLINIC | Age: 69
End: 2024-02-21
Payer: COMMERCIAL

## 2024-02-22 ENCOUNTER — E-VISIT (OUTPATIENT)
Dept: FAMILY MEDICINE | Facility: CLINIC | Age: 69
End: 2024-02-22
Payer: COMMERCIAL

## 2024-02-22 DIAGNOSIS — U07.1 COVID: Primary | ICD-10-CM

## 2024-02-22 PROCEDURE — 99423 OL DIG E/M SVC 21+ MIN: CPT | Mod: ,,, | Performed by: INTERNAL MEDICINE

## 2024-02-22 NOTE — PROGRESS NOTES
Patient ID: Nan Avina is a 69 y.o. female.    Chief Complaint: URI (Entered automatically based on patient selection in Patient Portal.)    The patient initiated a request through [employer] on 2/22/2024 for evaluation and management with a chief complaint of URI (Entered automatically based on patient selection in Patient Portal.)     I evaluated the questionnaire submission on today.    [facility] Peq Evisit Upper Respitatory/Cough Questionnaire    2/22/2024 11:38 AM CST - Filed by Patient   Do you agree to participate in an E-Visit? Yes   If you have any of the following symptoms, please present to your local ER or call 911:  I acknowledge   What is the main issue that you would like for your doctor to address today? I tested positive for covid on Feb 21. 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  I need advice on my care so I can continue to care for her. Also, what to watch when caring for her.   Are you able to take your vital signs? Yes   Systolic Blood Pressure: 131   Diastolic Blood Pressure: 87   Weight: 133   Height: 62   Pulse: 81   Temperature: 98.6   Respiration rate:    Pulse Oxygen: 98   What symptoms do you currently have?  Chills;  Cough;  Fatigue;  Nasal Congestion;  Muscle or body aches;  Runny nose   Describe your cough: Dry   Have you ever smoked? I have never smoked   Have you had a fever? Yes   What has been the range of your fever? Less than 100.4   When did your symptoms first appear? 2/21/2024   In the last two weeks, have you been in close contact with someone who has COVID-19 or the Flu? Yes , Covid-19   In the last two weeks, have you worked or volunteered in a healthcare facility or as a ? Healthcare facilities include a hospital, medical or dental clinic, long-term care facility, or nursing home No   Do you live in a long-term care facility, nursing home, group home, or homeless shelter? No   List what you have done or taken to help your symptoms.  Tylenol and sleep   How severe are your symptoms? Moderate   Have your symptoms improved since they first appeared? No change   Have you taken an at home Covid test? Yes   What were the results? Positive   Have you taken a Flu test? No   Have you been fully vaccinated for COVID? (2 Pfizer, 2 Moderna or 1 Camron & Camron vaccine injections) Yes   Have you received a booster? No   Have you recieved a Flu shot? No   Do you have transportation to get tested for COVID if it is indicated and ordered for you at an [facility] location? Yes   Provide any information you feel is important to your history not asked above 1.  I really don't have chills but I was very cold yesterday.    2.  I had a low temperature yesterday and none today so that has improved for now.  3. I only said I was exposed to someone with Covid because I tested positive.   Please attach any relevant images or files          Encounter Diagnosis   Name Primary?    COVID Yes        No orders of the defined types were placed in this encounter.           No follow-ups on file.      E-Visit Time Tracking:  Over 25 minutes reviewing chart, medications and making recommendations to patient has in the message sent to her.  She is a patient and so is her elderly mother so detailed information given regarding reduction in passing on the virus.

## 2024-03-26 ENCOUNTER — PATIENT OUTREACH (OUTPATIENT)
Dept: ADMINISTRATIVE | Facility: HOSPITAL | Age: 69
End: 2024-03-26
Payer: COMMERCIAL

## 2024-03-26 DIAGNOSIS — Z12.11 ENCOUNTER FOR SCREENING FOR COLORECTAL MALIGNANT NEOPLASM: ICD-10-CM

## 2024-03-26 DIAGNOSIS — Z12.31 BREAST CANCER SCREENING BY MAMMOGRAM: Primary | ICD-10-CM

## 2024-03-26 DIAGNOSIS — Z12.12 ENCOUNTER FOR SCREENING FOR COLORECTAL MALIGNANT NEOPLASM: ICD-10-CM

## 2024-08-26 ENCOUNTER — OFFICE VISIT (OUTPATIENT)
Dept: FAMILY MEDICINE | Facility: CLINIC | Age: 69
End: 2024-08-26
Payer: COMMERCIAL

## 2024-08-26 ENCOUNTER — HOSPITAL ENCOUNTER (OUTPATIENT)
Dept: RADIOLOGY | Facility: HOSPITAL | Age: 69
Discharge: HOME | End: 2024-08-26
Attending: INTERNAL MEDICINE
Payer: COMMERCIAL

## 2024-08-26 VITALS
DIASTOLIC BLOOD PRESSURE: 70 MMHG | OXYGEN SATURATION: 98 % | TEMPERATURE: 98 F | WEIGHT: 135.56 LBS | SYSTOLIC BLOOD PRESSURE: 122 MMHG | HEART RATE: 70 BPM | HEIGHT: 62 IN | BODY MASS INDEX: 24.95 KG/M2

## 2024-08-26 DIAGNOSIS — D75.89 MACROCYTIC: ICD-10-CM

## 2024-08-26 DIAGNOSIS — Z01.818 PREOPERATIVE EXAMINATION: ICD-10-CM

## 2024-08-26 DIAGNOSIS — Z12.12 SCREENING FOR COLORECTAL CANCER: ICD-10-CM

## 2024-08-26 DIAGNOSIS — B00.89 RECURRENT ORAL HERPES SIMPLEX INFECTION: ICD-10-CM

## 2024-08-26 DIAGNOSIS — R14.2 BELCHING: ICD-10-CM

## 2024-08-26 DIAGNOSIS — E55.9 VITAMIN D DEFICIENCY DISEASE: ICD-10-CM

## 2024-08-26 DIAGNOSIS — K21.9 GASTROESOPHAGEAL REFLUX DISEASE, UNSPECIFIED WHETHER ESOPHAGITIS PRESENT: ICD-10-CM

## 2024-08-26 DIAGNOSIS — M81.0 OSTEOPOROSIS, POSTMENOPAUSAL: ICD-10-CM

## 2024-08-26 DIAGNOSIS — R79.89 ABNORMAL LIVER FUNCTION TESTS: ICD-10-CM

## 2024-08-26 DIAGNOSIS — G43.909 MIGRAINE SYNDROME: ICD-10-CM

## 2024-08-26 DIAGNOSIS — Z12.31 ENCOUNTER FOR SCREENING MAMMOGRAM FOR BREAST CANCER: ICD-10-CM

## 2024-08-26 DIAGNOSIS — Z12.11 SCREENING FOR COLORECTAL CANCER: ICD-10-CM

## 2024-08-26 DIAGNOSIS — H26.9 CATARACT, UNSPECIFIED CATARACT TYPE, UNSPECIFIED LATERALITY: ICD-10-CM

## 2024-08-26 DIAGNOSIS — Z00.00 ROUTINE MEDICAL EXAM: Primary | ICD-10-CM

## 2024-08-26 DIAGNOSIS — Z12.31 BREAST CANCER SCREENING BY MAMMOGRAM: ICD-10-CM

## 2024-08-26 PROCEDURE — 3008F BODY MASS INDEX DOCD: CPT | Mod: CPTII,S$GLB,, | Performed by: INTERNAL MEDICINE

## 2024-08-26 PROCEDURE — 99397 PER PM REEVAL EST PAT 65+ YR: CPT | Mod: S$GLB,,, | Performed by: INTERNAL MEDICINE

## 2024-08-26 PROCEDURE — 99999 PR PBB SHADOW E&M-EST. PATIENT-LVL IV: CPT | Mod: PBBFAC,,, | Performed by: INTERNAL MEDICINE

## 2024-08-26 PROCEDURE — 3288F FALL RISK ASSESSMENT DOCD: CPT | Mod: CPTII,S$GLB,, | Performed by: INTERNAL MEDICINE

## 2024-08-26 PROCEDURE — 1101F PT FALLS ASSESS-DOCD LE1/YR: CPT | Mod: CPTII,S$GLB,, | Performed by: INTERNAL MEDICINE

## 2024-08-26 PROCEDURE — 1159F MED LIST DOCD IN RCRD: CPT | Mod: CPTII,S$GLB,, | Performed by: INTERNAL MEDICINE

## 2024-08-26 PROCEDURE — 1126F AMNT PAIN NOTED NONE PRSNT: CPT | Mod: CPTII,S$GLB,, | Performed by: INTERNAL MEDICINE

## 2024-08-26 PROCEDURE — 3074F SYST BP LT 130 MM HG: CPT | Mod: CPTII,S$GLB,, | Performed by: INTERNAL MEDICINE

## 2024-08-26 PROCEDURE — 77067 SCR MAMMO BI INCL CAD: CPT | Mod: TC,PO

## 2024-08-26 PROCEDURE — 3078F DIAST BP <80 MM HG: CPT | Mod: CPTII,S$GLB,, | Performed by: INTERNAL MEDICINE

## 2024-08-26 RX ORDER — VALACYCLOVIR HYDROCHLORIDE 1 G/1
TABLET, FILM COATED ORAL
Qty: 30 TABLET | Refills: 6 | Status: SHIPPED | OUTPATIENT
Start: 2024-08-26

## 2024-08-26 NOTE — PROGRESS NOTES
Chief complaint  physical exam and other issues    69-year-old white female. mammo .  Again discussed colon cancer screening.  She has a colo guard just did not want to do appear to explain how easy it is in some other ways by which he can make it easier but they really is not much excuse to do it if not wanting to pursue a colonoscopy.  I provided her with a new sample kit  and today - apparently she sent it in but then was told by the company that the order was  so will put in another order and she is willing to do it     Reviewed and discuss osteoporosis at length.  It does look like two years I sent her the results message that she did read but does not remember really having the bone density done.  We discussed the significance of her osteoporosis especially at her age of 69 and her expected longevity and that this will be an investment in her independence in regards to fracture risk reduction.  She is open to seeing Endocrine we discussed available options but bone density report did discuss possibly the being more aggressive.        She does have some outside bone densities done by her gyn.  She has had 3 of them.  The last 1 was 2022 and it did show OP  *Osteoporosis based on T-score between -1.0 and -2.5 and elevated risk based on FRAX  *Fracture risk is very high based on FRAX  *Compared with previous DXA in 2012, BMD at the lumbar spine has declined by 3.3%, and the BMD at the total hip has declined by 6.1%.    Lots of stress caring for her mom.  She is the only caregiver.  Occasionally she gets some tightness around her torso sometimes left and sometimes the right.  We discussed this sounds very much like muscle overuse and associated spasm and it could be her pulling up on her mother and so forth as a potential cause.  She probably needs to apply heat and a hot shower every morning so that her daily activities will not cause recurrent muscle injury...  Her   had six cardiac  bypass in November 2022 and that caused some increased stress    Occasionally her heart will beat fast and all occurs while at rest.  It has been ongoing for years and only lasts a few seconds.  She will pay attention to see if it is regular or irregular and how frequent.  If frequent enough we could try catching it on a Holter monitor.  She has no exertional chest pains or shortness of breath that would warrant any ischemic workup at this time.    Less headaches since her gyn is reducing her the dose of her hormone replacement patch     she had uncontrollable belching every night a couple of hours after she went to sleep she would awake with it.  She had some shortness of breath and her chest with it and explained that was probably her version of reflux and heartburn.  She would use Tums and it will be better.  She was set up and it would be better.  She only used her omeprazole when she was taking Advil I discussed and reassured she can use it all the time and I will likely help the situation. Burping still there but better w PPI, sits up and burps to relieve CP. Awakes in 2-3 hrs burping, last year was all day long.  Since putting her on the omeprazole 40 mg it is definitely less frequent and severe but still present.  She has to care for her mom so really had difficulty taking time off for things such as a colonoscopy so similar with an EGD so will at least order an upper GI to get an idea if she has any anatomic issues related to the burping such as a hiatal hernia and so forth.  Keeping log and can't figure out but less often, a few times a mo.     She did see vascular.  She wore the stockings in her lower extremity pain is improved.  Encouraged her to continue to wear the stockings when needed.  Seen vasc 9/22  -recommend compression with Rx stockings, elevation, dietary changes associated with water and sodium intake discussed at length with patient  -Exercise   -rec L SSV EVLT  -Future BLE  sclerotherapy  -RTC 3 months for further evaluation        ROS:   CONST: weight stable. EYES: no vision change. ENT: no sore throat. CV: no chest pain w/ exertion. RESP: no shortness of breath. GI: no nausea, vomiting, diarrhea. No dysphagia. : no urinary issues. MUSCULOSKELETAL: no new myalgias or arthralgias. SKIN: no new changes. NEURO: no focal deficits. PSYCH: no new issues. ENDOCRINE: no polyuria. HEME: no lymph nodes. ALLERGY: no general pruritis.      Past medical history :  1.  Hypertension ???  2.  Migraines  3.  Fever blisters  4.  ALLERGIES  5.  Scalp dermatitis  6. Normal colonoscopy in past  7. Nl BMD in past per Gyn  8.  OP - 11/22      Past Surgical History   Procedure Laterality Date    Tonsillectomy, adenoidectomy      Breast biopsy      Oophorectomy      Dilation and curettage of uterus      Vocal cord nodules      Incisional breast biopsy  2004    Hysterectomy  2003     [facility]       Social History     Socioeconomic History    Marital status:     Number of children: 3   Occupational History    Occupation: Unemployed   Tobacco Use    Smoking status: Never     Passive exposure: Never    Smokeless tobacco: Never   Substance and Sexual Activity    Alcohol use: Yes     Alcohol/week: 5.0 standard drinks of alcohol     Types: 5 Glasses of wine per week     Comment: occasional    Drug use: No    Sexual activity: Yes     Partners: Male     Birth control/protection: Surgical     Comment:  since 1976   Social History Narrative    . First grandchild born 9/2012.     Works at a school.      family history includes Breast cancer in her maternal aunt, maternal aunt, and maternal aunt; Cancer in her brother; Coronary artery disease in her father and mother; Diabetes in her brother; Stroke in her father and mother.      Vital signs as above  Gen: no distress  EYES: conjunctiva clear, non-icteric, PERRL, no nystagmus  ENT: nose clear, nasal mucosa normal, oropharynx clear and moist, teeth  good  NECK:supple, thyroid non-palpable  RESP: effort is good, lungs clear  CV: heart RRR w/o murmur, gallops or rubs; no carotid bruits, no edema  GI: abdomen soft, non-distended, non-tender, no hepatosplenomegaly  MS: gait normal, no clubbing or cyanosis of the digits,  SKIN: no rashes, warm to touch    Diagnoses and all orders for this visit:    Routine medical exam  -     Comprehensive Metabolic Panel; Future  -     Hemoglobin A1C; Future  -     CBC Auto Differential; Future  -     TSH; Future  -     Lipid Panel; Future  -     Vitamin D; Future    Encounter for screening mammogram for breast cancer, had mammogram    Screening for colorectal cancer, still overdue, send fresh Cologuard  -     Cologuard Screening (Multitarget Stool DNA); Future  -     Cologuard Screening (Multitarget Stool DNA)    Cataract, unspecified cataract type, unspecified laterality    Preoperative examination, apparently going to get cataract surgery and she has an appointment tomorrow where she might get paperwork that she can drop off    Vitamin D deficiency disease  -     Vitamin D; Future    Abnormal liver function tests  -     Comprehensive Metabolic Panel; Future  -     Hemoglobin A1C; Future  -     CBC Auto Differential; Future  -     TSH; Future  -     Lipid Panel; Future  -     Vitamin D; Future    Macrocytic  -     CBC Auto Differential; Future    Gastroesophageal reflux disease, unspecified whether esophagitis present    Migraine syndrome    Osteoporosis, postmenopausal, refer to endocrine to discuss options  -     DXA Bone Density Axial Skeleton 1 or more sites; Future  -     Ambulatory referral/consult to Endocrinology; Future    Recurrent oral herpes simplex infection  -     valACYclovir (VALTREX) 1000 MG tablet; 2 pills the repeat 2 pills 12 hrs later    Belching, improved overall, did not take PPI, she is keeping a diary.  Very much seems to be food related.  Reviewed upper GI which did not show any obvious hiatal hernia

## 2024-08-28 ENCOUNTER — LAB VISIT (OUTPATIENT)
Dept: LAB | Facility: HOSPITAL | Age: 69
End: 2024-08-28
Attending: INTERNAL MEDICINE
Payer: COMMERCIAL

## 2024-08-28 DIAGNOSIS — R79.89 ABNORMAL LIVER FUNCTION TESTS: ICD-10-CM

## 2024-08-28 DIAGNOSIS — Z00.00 ROUTINE MEDICAL EXAM: ICD-10-CM

## 2024-08-28 DIAGNOSIS — E55.9 VITAMIN D DEFICIENCY DISEASE: ICD-10-CM

## 2024-08-28 DIAGNOSIS — D75.89 MACROCYTIC: ICD-10-CM

## 2024-08-28 LAB
25(OH)D3+25(OH)D2 SERPL-MCNC: 31 NG/ML (ref 30–96)
ALBUMIN SERPL BCP-MCNC: 3.8 G/DL (ref 3.5–5.2)
ALP SERPL-CCNC: 74 U/L (ref 55–135)
ALT SERPL W/O P-5'-P-CCNC: 46 U/L (ref 10–44)
ANION GAP SERPL CALC-SCNC: 7 MMOL/L (ref 8–16)
AST SERPL-CCNC: 29 U/L (ref 10–40)
BASOPHILS # BLD AUTO: 0.04 K/UL (ref 0–0.2)
BASOPHILS NFR BLD: 0.6 % (ref 0–1.9)
BILIRUB SERPL-MCNC: 0.6 MG/DL (ref 0.1–1)
BUN SERPL-MCNC: 14 MG/DL (ref 8–23)
CALCIUM SERPL-MCNC: 9 MG/DL (ref 8.7–10.5)
CHLORIDE SERPL-SCNC: 107 MMOL/L (ref 95–110)
CHOLEST SERPL-MCNC: 195 MG/DL (ref 120–199)
CHOLEST/HDLC SERPL: 3.4 {RATIO} (ref 2–5)
CO2 SERPL-SCNC: 25 MMOL/L (ref 23–29)
CREAT SERPL-MCNC: 0.7 MG/DL (ref 0.5–1.4)
DIFFERENTIAL METHOD BLD: NORMAL
EOSINOPHIL # BLD AUTO: 0.2 K/UL (ref 0–0.5)
EOSINOPHIL NFR BLD: 2.7 % (ref 0–8)
ERYTHROCYTE [DISTWIDTH] IN BLOOD BY AUTOMATED COUNT: 13.2 % (ref 11.5–14.5)
EST. GFR  (NO RACE VARIABLE): >60 ML/MIN/1.73 M^2
ESTIMATED AVG GLUCOSE: 103 MG/DL (ref 68–131)
GLUCOSE SERPL-MCNC: 99 MG/DL (ref 70–110)
HBA1C MFR BLD: 5.2 % (ref 4–5.6)
HCT VFR BLD AUTO: 42.1 % (ref 37–48.5)
HDLC SERPL-MCNC: 58 MG/DL (ref 40–75)
HDLC SERPL: 29.7 % (ref 20–50)
HGB BLD-MCNC: 13.5 G/DL (ref 12–16)
IMM GRANULOCYTES # BLD AUTO: 0.02 K/UL (ref 0–0.04)
IMM GRANULOCYTES NFR BLD AUTO: 0.3 % (ref 0–0.5)
LDLC SERPL CALC-MCNC: 122.6 MG/DL (ref 63–159)
LYMPHOCYTES # BLD AUTO: 1.9 K/UL (ref 1–4.8)
LYMPHOCYTES NFR BLD: 30.4 % (ref 18–48)
MCH RBC QN AUTO: 30.5 PG (ref 27–31)
MCHC RBC AUTO-ENTMCNC: 32.1 G/DL (ref 32–36)
MCV RBC AUTO: 95 FL (ref 82–98)
MONOCYTES # BLD AUTO: 0.7 K/UL (ref 0.3–1)
MONOCYTES NFR BLD: 10.5 % (ref 4–15)
NEUTROPHILS # BLD AUTO: 3.5 K/UL (ref 1.8–7.7)
NEUTROPHILS NFR BLD: 55.5 % (ref 38–73)
NONHDLC SERPL-MCNC: 137 MG/DL
NRBC BLD-RTO: 0 /100 WBC
PLATELET # BLD AUTO: 324 K/UL (ref 150–450)
PMV BLD AUTO: 10.3 FL (ref 9.2–12.9)
POTASSIUM SERPL-SCNC: 4.1 MMOL/L (ref 3.5–5.1)
PROT SERPL-MCNC: 6.7 G/DL (ref 6–8.4)
RBC # BLD AUTO: 4.42 M/UL (ref 4–5.4)
SODIUM SERPL-SCNC: 139 MMOL/L (ref 136–145)
TRIGL SERPL-MCNC: 72 MG/DL (ref 30–150)
TSH SERPL DL<=0.005 MIU/L-ACNC: 3.42 UIU/ML (ref 0.4–4)
WBC # BLD AUTO: 6.26 K/UL (ref 3.9–12.7)

## 2024-08-28 PROCEDURE — 85025 COMPLETE CBC W/AUTO DIFF WBC: CPT | Performed by: INTERNAL MEDICINE

## 2024-08-28 PROCEDURE — 83036 HEMOGLOBIN GLYCOSYLATED A1C: CPT | Performed by: INTERNAL MEDICINE

## 2024-08-28 PROCEDURE — 36415 COLL VENOUS BLD VENIPUNCTURE: CPT | Mod: PO | Performed by: INTERNAL MEDICINE

## 2024-08-28 PROCEDURE — 80061 LIPID PANEL: CPT | Performed by: INTERNAL MEDICINE

## 2024-08-28 PROCEDURE — 84443 ASSAY THYROID STIM HORMONE: CPT | Performed by: INTERNAL MEDICINE

## 2024-08-28 PROCEDURE — 80053 COMPREHEN METABOLIC PANEL: CPT | Performed by: INTERNAL MEDICINE

## 2024-08-28 PROCEDURE — 82306 VITAMIN D 25 HYDROXY: CPT | Performed by: INTERNAL MEDICINE

## 2024-09-03 ENCOUNTER — TELEPHONE (OUTPATIENT)
Dept: FAMILY MEDICINE | Facility: CLINIC | Age: 69
End: 2024-09-03
Payer: COMMERCIAL

## 2024-09-03 NOTE — TELEPHONE ENCOUNTER
Spoke with patient and she was inquiring about her eye surgical clearance form that was both in last week. After speaking with provider's JOSEPH Holt, she informed me that this form was faxed over to the Outpatient eye clinic on 8/30/2024. Patient informed.

## 2024-09-03 NOTE — TELEPHONE ENCOUNTER
----- Message from Monique Mcginnis sent at 9/3/2024 10:52 AM CDT -----  Name of Who is calling :  MARIO RUSSELL [[id number]]      What is the request in detail:  Pt would like  Pavel to call her back in regards to surgery approval . Please assist       Can the clinic reply by [facility]:  no          What number to call back if not in [facility]:[phone]

## 2024-10-25 DIAGNOSIS — E89.40 POSTSURGICAL MENOPAUSE: ICD-10-CM

## 2024-10-25 RX ORDER — ESTRADIOL 0.03 MG/D
1 FILM, EXTENDED RELEASE TRANSDERMAL
Qty: 24 PATCH | Refills: 3 | Status: CANCELLED | OUTPATIENT
Start: 2024-10-28

## 2024-10-28 RX ORDER — ESTRADIOL 0.03 MG/D
1 FILM, EXTENDED RELEASE TRANSDERMAL
Qty: 24 PATCH | Refills: 0 | Status: SHIPPED | OUTPATIENT
Start: 2024-10-28

## 2024-11-25 ENCOUNTER — OFFICE VISIT (OUTPATIENT)
Dept: OBSTETRICS AND GYNECOLOGY | Facility: CLINIC | Age: 69
End: 2024-11-25
Payer: COMMERCIAL

## 2024-11-25 VITALS
DIASTOLIC BLOOD PRESSURE: 70 MMHG | HEIGHT: 62 IN | WEIGHT: 132.25 LBS | SYSTOLIC BLOOD PRESSURE: 118 MMHG | BODY MASS INDEX: 24.34 KG/M2

## 2024-11-25 DIAGNOSIS — E89.40 POSTSURGICAL MENOPAUSE: ICD-10-CM

## 2024-11-25 DIAGNOSIS — Z01.419 ENCOUNTER FOR GYNECOLOGICAL EXAMINATION WITHOUT ABNORMAL FINDING: Primary | ICD-10-CM

## 2024-11-25 DIAGNOSIS — R39.15 URINARY URGENCY: ICD-10-CM

## 2024-11-25 PROCEDURE — 1159F MED LIST DOCD IN RCRD: CPT | Mod: CPTII,S$GLB,, | Performed by: OBSTETRICS & GYNECOLOGY

## 2024-11-25 PROCEDURE — 1101F PT FALLS ASSESS-DOCD LE1/YR: CPT | Mod: CPTII,S$GLB,, | Performed by: OBSTETRICS & GYNECOLOGY

## 2024-11-25 PROCEDURE — 3044F HG A1C LEVEL LT 7.0%: CPT | Mod: CPTII,S$GLB,, | Performed by: OBSTETRICS & GYNECOLOGY

## 2024-11-25 PROCEDURE — 3008F BODY MASS INDEX DOCD: CPT | Mod: CPTII,S$GLB,, | Performed by: OBSTETRICS & GYNECOLOGY

## 2024-11-25 PROCEDURE — 3078F DIAST BP <80 MM HG: CPT | Mod: CPTII,S$GLB,, | Performed by: OBSTETRICS & GYNECOLOGY

## 2024-11-25 PROCEDURE — 99999 PR PBB SHADOW E&M-EST. PATIENT-LVL III: CPT | Mod: PBBFAC,,, | Performed by: OBSTETRICS & GYNECOLOGY

## 2024-11-25 PROCEDURE — 3074F SYST BP LT 130 MM HG: CPT | Mod: CPTII,S$GLB,, | Performed by: OBSTETRICS & GYNECOLOGY

## 2024-11-25 PROCEDURE — 87086 URINE CULTURE/COLONY COUNT: CPT | Performed by: OBSTETRICS & GYNECOLOGY

## 2024-11-25 PROCEDURE — 1126F AMNT PAIN NOTED NONE PRSNT: CPT | Mod: CPTII,S$GLB,, | Performed by: OBSTETRICS & GYNECOLOGY

## 2024-11-25 PROCEDURE — 1160F RVW MEDS BY RX/DR IN RCRD: CPT | Mod: CPTII,S$GLB,, | Performed by: OBSTETRICS & GYNECOLOGY

## 2024-11-25 PROCEDURE — 3288F FALL RISK ASSESSMENT DOCD: CPT | Mod: CPTII,S$GLB,, | Performed by: OBSTETRICS & GYNECOLOGY

## 2024-11-25 PROCEDURE — 99397 PER PM REEVAL EST PAT 65+ YR: CPT | Mod: S$GLB,,, | Performed by: OBSTETRICS & GYNECOLOGY

## 2024-11-25 RX ORDER — ESTRADIOL 0.03 MG/D
1 FILM, EXTENDED RELEASE TRANSDERMAL
Qty: 24 PATCH | Refills: 4 | Status: SHIPPED | OUTPATIENT
Start: 2024-11-25

## 2024-11-25 NOTE — PROGRESS NOTES
Subjective:       Patient ID: Nan Avina is a 69 y.o. female.    Chief Complaint:  Annual Exam and Gynecologic Exam      History of Present Illness  Gynecologic Exam  Associated symptoms include urgency. Pertinent negatives include no abdominal pain, back pain, dysuria or headaches.     Nan Avina is a 69 y.o. female  here for her annual GYN exam.     She is menopausal since age 48 at the time of her hysterectomy with BSO. Doing well on current low dose HRT, wants to continue it.   HRT:  Vivelle dot 0.025  denies vaginal itching or irritation.  Denies vaginal discharge.  She is sexually active. She has had1 partner for the  past.48 years   History of abnormal pap: No  Last Pap:  had hysterectomy  Last MMG: normal-2024: BI-RADS Category 1: Negative -routine follow-up in 12 months  Last Colonoscopy:  cologuard 2024: normal  denies domestic violence. She does feel safe at home.     Past Medical History:   Diagnosis Date    Allergic rhinitis, seasonal 2016    Fever blister     Migraine headache     Migraine syndrome 2016    Osteopenia 2019    Osteoporosis, postmenopausal 2024    Polycystic ovarian syndrome     Vertigo      Past Surgical History:   Procedure Laterality Date    BREAST BIOPSY      DILATION AND CURETTAGE OF UTERUS      HYSTERECTOMY      [facility]    INCISIONAL BREAST BIOPSY  2004    OOPHORECTOMY      TONSILLECTOMY, ADENOIDECTOMY      Vocal cord nodules       Social History     Socioeconomic History    Marital status:     Number of children: 3   Occupational History    Occupation: Unemployed   Tobacco Use    Smoking status: Never     Passive exposure: Never    Smokeless tobacco: Never   Substance and Sexual Activity    Alcohol use: Yes     Alcohol/week: 5.0 standard drinks of alcohol     Types: 5 Glasses of wine per week     Comment: occasional    Drug use: No    Sexual activity: Yes     Partners: Male     Birth control/protection:  "Surgical     Comment:  since    Social History Narrative    . First grandchild born 2012.     Works at a school.      Social Drivers of Health     Financial Resource Strain: Low Risk  (2024)    Overall Financial Resource Strain (CARDIA)     Difficulty of Paying Living Expenses: Not hard at all   Food Insecurity: No Food Insecurity (2024)    Hunger Vital Sign     Worried About Running Out of Food in the Last Year: Never true     Ran Out of Food in the Last Year: Never true   Physical Activity: Unknown (2024)    Exercise Vital Sign     Days of Exercise per Week: 5 days     Minutes of Exercise per Session: Patient declined   Stress: Stress Concern Present (2024)    [employer] - Occupational Stress Questionnaire     Feeling of Stress : To some extent   Housing Stability: Unknown (2024)    Housing Stability Vital Sign     Unable to Pay for Housing in the Last Year: No     Family History   Problem Relation Name Age of Onset    Cancer Brother          The patient's brother  from cancer at age 58 unknown source of cancer possible liver    Diabetes Brother      Breast cancer Maternal Aunt Edith     Breast cancer Maternal Aunt Ju     Stroke Father 92     Coronary artery disease Father 92     Coronary artery disease Mother 94     Stroke Mother 94     Breast cancer Maternal Aunt Stefanie     Colon cancer Neg Hx      Ovarian cancer Neg Hx      Hypertension Neg Hx       OB History          3    Para   3    Term   3            AB        Living   3         SAB        IAB        Ectopic        Multiple        Live Births   3                 /70   Ht 5' 2.01" (1.575 m)   Wt 60 kg (132 lb 4.4 oz)   BMI 24.19 kg/m²         GYN & OB History    Date of Last Pap: No result found    OB History    Para Term  AB Living   3 3 3     3   SAB IAB Ectopic Multiple Live Births           3      # Outcome Date GA Lbr Hang/2nd " Weight Sex Type Anes PTL Lv   3 Term      Vag-Spont   ISAI   2 Term      Vag-Spont   ISAI   1 Term      Vag-Spont   ISAI       Review of Systems  Review of Systems   Constitutional:  Negative for activity change, appetite change, fatigue and unexpected weight change.   HENT: Negative.     Eyes:  Negative for visual disturbance.   Respiratory:  Negative for shortness of breath and wheezing.    Cardiovascular:  Negative for chest pain, palpitations and leg swelling.   Gastrointestinal:  Negative for abdominal pain, bloating and blood in stool.   Endocrine: Negative for diabetes and hair loss.   Genitourinary:  Positive for urgency. Negative for decreased libido, dyspareunia, dysuria and hot flashes.   Musculoskeletal:  Negative for back pain and joint swelling.   Integumentary:  Negative for acne, hair changes and nipple discharge.   Neurological:  Negative for headaches.   Hematological:  Does not bruise/bleed easily.   Psychiatric/Behavioral:  Negative for depression and sleep disturbance. The patient is not nervous/anxious.    Breast: Negative for mastodynia and nipple discharge          Objective:      Physical Exam:   Constitutional: She is oriented to person, place, and time. She appears well-developed and well-nourished.    HENT:   Head: Normocephalic and atraumatic.    Eyes: Pupils are equal, round, and reactive to light. EOM are normal.     Cardiovascular:  Normal rate and regular rhythm.             Pulmonary/Chest: Effort normal and breath sounds normal.   BREASTS:  no mass, no tenderness, no deformity and no retraction. Right breast exhibits no inverted nipple, no mass, no nipple discharge, no skin change, no tenderness, no bleeding and no swelling. Left breast exhibits no inverted nipple, no mass, no nipple discharge, no skin change, no tenderness, no bleeding and no swelling. Breasts are symmetrical.              Abdominal: Soft. Bowel sounds are normal.     Genitourinary:    Pelvic exam was performed with  patient supine.      Genitourinary Comments: PELVIC: Normal external female genitalia without lesions. Normal hair distribution. Adequate perineal body, normal urethral meatus. Vagina atrophic , well rugated without lesions or discharge. No significant cystocele , stage 2 rectocele. Bimanual exam shows uterus and cervix to be surgically absent. Adnexa without masses or tenderness.  RECTAL: Deferred                 Musculoskeletal: Normal range of motion and moves all extremeties.       Neurological: She is alert and oriented to person, place, and time.    Skin: Skin is warm and dry.    Psychiatric: She has a normal mood and affect.              Assessment:        1. Encounter for gynecological examination without abnormal finding    2. Postsurgical menopause    3. Urinary urgency                Plan:      Problem List Items Addressed This Visit    None  Visit Diagnoses       Encounter for gynecological examination without abnormal finding    -  Primary  COUNSELING:  The patient was counseled today on regular weight bearing exercise. Patient was counseled today on the new ACS guidelines for cervical cytology screening as well as the current recommendations for breast cancer screening. Counseling session lasted approximately 10 minutes, and all her questions were answered. She was advised to see her primary care physician for all other health maintenance.   FOLLOW-UP with me for next routine visit.       Postsurgical menopause        Relevant Medications    estradioL (VIVELLE-DOT) 0.025 mg/24 hr    Urinary urgency        Relevant Orders    Urine culture             Follow up in about 1 year (around 11/25/2025).

## 2024-11-26 LAB — BACTERIA UR CULT: NO GROWTH

## 2024-12-02 ENCOUNTER — HOSPITAL ENCOUNTER (OUTPATIENT)
Dept: RADIOLOGY | Facility: CLINIC | Age: 69
Discharge: HOME | End: 2024-12-02
Attending: INTERNAL MEDICINE
Payer: COMMERCIAL

## 2024-12-02 DIAGNOSIS — M81.0 OSTEOPOROSIS, POSTMENOPAUSAL: ICD-10-CM

## 2024-12-02 PROCEDURE — 77080 DXA BONE DENSITY AXIAL: CPT | Mod: TC,PO

## 2024-12-02 PROCEDURE — 77080 DXA BONE DENSITY AXIAL: CPT | Mod: 26,,, | Performed by: INTERNAL MEDICINE

## 2024-12-03 ENCOUNTER — PATIENT MESSAGE (OUTPATIENT)
Dept: FAMILY MEDICINE | Facility: CLINIC | Age: 69
End: 2024-12-03
Payer: COMMERCIAL

## 2025-01-28 ENCOUNTER — TELEPHONE (OUTPATIENT)
Dept: OBSTETRICS AND GYNECOLOGY | Facility: CLINIC | Age: 70
End: 2025-01-28
Payer: COMMERCIAL

## 2025-01-28 DIAGNOSIS — E89.40 POSTSURGICAL MENOPAUSE: ICD-10-CM

## 2025-01-28 RX ORDER — ESTRADIOL 0.03 MG/D
FILM, EXTENDED RELEASE TRANSDERMAL
Qty: 24 PATCH | Refills: 4 | Status: SHIPPED | OUTPATIENT
Start: 2025-01-28

## 2025-01-28 NOTE — TELEPHONE ENCOUNTER
----- Message from Zen sent at 2025  2:22 PM CST -----  Regarding: Self  [phone]  Type: Patient Call Back    Who called: Self     What is the request in detail: pt called to find out information on her medication estradioL (VIVELLE-DOT) 0.025 mg/24 hr, stating that she knows it just got filled but wants to know why it  in the first place. Would like a call back.     Can the clinic reply by [facility]? No     Would the patient rather a call back or a response via [facility]? Call back     Best call back number: [phone]     Additional Information:    Thank you.

## 2025-04-28 ENCOUNTER — LAB VISIT (OUTPATIENT)
Dept: LAB | Facility: HOSPITAL | Age: 70
End: 2025-04-28
Attending: INTERNAL MEDICINE
Payer: COMMERCIAL

## 2025-04-28 ENCOUNTER — OFFICE VISIT (OUTPATIENT)
Dept: ENDOCRINOLOGY | Facility: CLINIC | Age: 70
End: 2025-04-28
Payer: COMMERCIAL

## 2025-04-28 ENCOUNTER — RESULTS FOLLOW-UP (OUTPATIENT)
Dept: ENDOCRINOLOGY | Facility: CLINIC | Age: 70
End: 2025-04-28

## 2025-04-28 VITALS
HEIGHT: 62 IN | BODY MASS INDEX: 24.51 KG/M2 | DIASTOLIC BLOOD PRESSURE: 86 MMHG | SYSTOLIC BLOOD PRESSURE: 132 MMHG | WEIGHT: 133.19 LBS

## 2025-04-28 DIAGNOSIS — M81.0 OSTEOPOROSIS, POSTMENOPAUSAL: Primary | ICD-10-CM

## 2025-04-28 DIAGNOSIS — E55.9 VITAMIN D DEFICIENCY DISEASE: ICD-10-CM

## 2025-04-28 DIAGNOSIS — M81.0 OSTEOPOROSIS, POSTMENOPAUSAL: ICD-10-CM

## 2025-04-28 PROBLEM — M85.80 OSTEOPENIA: Status: RESOLVED | Noted: 2019-07-09 | Resolved: 2025-04-28

## 2025-04-28 LAB
25(OH)D3+25(OH)D2 SERPL-MCNC: 50 NG/ML (ref 30–96)
ALBUMIN SERPL BCP-MCNC: 3.8 G/DL (ref 3.5–5.2)
ANION GAP (OHS): 8 MMOL/L (ref 8–16)
BUN SERPL-MCNC: 19 MG/DL (ref 8–23)
CALCIUM SERPL-MCNC: 9.1 MG/DL (ref 8.7–10.5)
CHLORIDE SERPL-SCNC: 105 MMOL/L (ref 95–110)
CO2 SERPL-SCNC: 27 MMOL/L (ref 23–29)
CREAT SERPL-MCNC: 0.7 MG/DL (ref 0.5–1.4)
GFR SERPLBLD CREATININE-BSD FMLA CKD-EPI: >60 ML/MIN/1.73/M2
GLUCOSE SERPL-MCNC: 86 MG/DL (ref 70–110)
PHOSPHATE SERPL-MCNC: 3.3 MG/DL (ref 2.7–4.5)
POTASSIUM SERPL-SCNC: 3.9 MMOL/L (ref 3.5–5.1)
PTH-INTACT SERPL-MCNC: 52.9 PG/ML (ref 9–77)
SODIUM SERPL-SCNC: 140 MMOL/L (ref 136–145)

## 2025-04-28 PROCEDURE — 83970 ASSAY OF PARATHORMONE: CPT

## 2025-04-28 PROCEDURE — 82306 VITAMIN D 25 HYDROXY: CPT

## 2025-04-28 PROCEDURE — 99999 PR PBB SHADOW E&M-EST. PATIENT-LVL IV: CPT | Mod: PBBFAC,,, | Performed by: INTERNAL MEDICINE

## 2025-04-28 PROCEDURE — 99204 OFFICE O/P NEW MOD 45 MIN: CPT | Mod: S$GLB,,, | Performed by: INTERNAL MEDICINE

## 2025-04-28 PROCEDURE — 80069 RENAL FUNCTION PANEL: CPT

## 2025-04-28 PROCEDURE — G2211 COMPLEX E/M VISIT ADD ON: HCPCS | Mod: S$GLB,,, | Performed by: INTERNAL MEDICINE

## 2025-04-28 PROCEDURE — 36415 COLL VENOUS BLD VENIPUNCTURE: CPT

## 2025-04-28 NOTE — PATIENT INSTRUCTIONS
We will do labs and a 24 hour urine collection    I have ordered the reclast. The infusion center will process the order and then contact you to schedule.    There are a few things I ask my patients to do before receiving reclast infusion.   1) The day before and day of the infusion please drink plenty of fluids.   2) The day of the infusion take over the counter tylenol one dose every six to eight hours for one day. This minimizes the joint pains that can occur with reclast.  3) Take an extra dose of over the counter vitamin D the day before the infusion.    Possible side effects from Reclast include:  Flu-like symptoms: Fever, chills, body aches, and fatigue are common within the first few days after the infusion. These symptoms usually subside but can be managed with over-the-counter pain relievers like acetaminophen.    Bone, joint, or muscle pain: Some individuals might experience pain in their bones, joints, or muscles after the infusion. This usually improves with time but can be discomforting initially.    Kidney problems: Rarely, Reclast can cause kidney issues. Patients should stay well-hydrated before and after the infusion to reduce this risk.      For calcium intake:  I advise you get 1200 mg per day of calcium, split up over the day into 2 to 4 divided doses.  This amount includes calcium from both dietary sources and/or calcium supplements, and it is best to get smaller amounts throughout the day rather than all of the calcium at one time; this allows for better absorption of the calcium.     To estimate calcium content from a nutrition label, the label lists the % calcium in that food.   For example, the label for an 8 oz glass of milk lists the calcium content as 30%.  This is equivalent to 300 mg of calcium (multiply the % by 10 to get the mg of calcium per serving).  It is best to try to get the majority of calcium intake from the diet.  I've included a table below of some calcium-rich  foods.    If you are not getting enough calcium in the diet, then you can add low doses of calcium supplements.  For calcium supplements, your body can only absorb about 500-600 mg of calcium at one time.  Thus, it is best to split the tablets up over the course of a day.  It is also best to avoid taking calcium supplements at the same time as a calcium-rich food to maximize your calcium absorption.  Calcium carbonate is best taken with or after a meal.  Calcium citrate can be taken on an empty stomach or with/after a meal.  Please look at any multivitamin you are taking as these often usually contain calcium as well.       Estimated Calcium Content of Foods:  Produce  Serving Size Estimated Calcium*    Adriana greens, frozen 8 oz 360 mg   Broccoli freddy 8 oz 200 mg   Kale, frozen 8 oz 180 mg   Soy Beans, green, boiled 8 oz 175 mg   Bok Berny, cooked, boiled 8 oz 160 mg   Figs, dried 2 figs 65 mg   Broccoli, fresh, cooked 8 oz 60 mg   Oranges 1 whole 55 mg   Seafood Serving Size Estimated Calcium*    Sardines, canned with bones 3 oz 325 mg   [place], canned with bones 3 oz 180 mg   Shrimp, canned 3 oz 125 mg   Dairy Serving Size Estimated Calcium*    Ricotta, part-skim 4 oz 335 mg   Yogurt, plain, low-fat 6 oz 310 mg   Milk, skim, low-fat, whole 8 oz 300 mg   Yogurt with fruit, low-fat 6 oz 260 mg   Mozzarella, part-skim 1 oz 210 mg   Cheddar 1 oz 205 mg   Yogurt, Greek 6 oz 200 mg   American Cheese 1 oz 195 mg   Feta Cheese 4 oz 140 mg   Cottage Cheese, 2% 4 oz 105 mg   Frozen yogurt, vanilla 8 oz 105 mg   Ice Cream, vanilla 8 oz 85 mg   Parmesan 1 tbsp 55 mg   Fortified Food Serving Size Estimated Calcium*   [place] milk, rice milk or soy milk, fortified 8 oz 300 mg   Orange juice and other fruit juices, fortified 8 oz 300 mg   Tofu, prepared with calcium 4 oz 205 mg   Waffle, frozen, fortified 2 pieces 200 mg   Oatmeal, fortified 1 packet 140 mg   English muffin, fortified 1 muffin 100 mg   Cereal, fortified 8 oz  100-1,000 mg   Other Serving Size Estimated Calcium*   Mac & cheese, frozen 1 package 325 mg   Pizza, cheese, frozen 1 serving 115 mg   Pudding, chocolate, prepared with 2% milk 4 oz 160 mg   Beans, baked, canned 4 oz 160 mg   *The calcium content listed for most foods is estimated and can vary due to multiple factors. Check the food label to determine how much calcium is in a particular product.  If you read the nutrition label for a food source, it lists the % calcium in that food.  For an 8 oz glass of milk, for example, the label states calcium 30%.  This is equivalent to 300 mg of calcium (multiply the listed number by 10).   **Table from the National Osteoporosis Foundation

## 2025-04-28 NOTE — PROGRESS NOTES
Nan Avina is a 70 y.o. female presenting for evaluation of osteoporosis      History of Present Illness      BONE HEALTH:  She has undergone multiple bone density scans between [id number] with Dr. Bradford. Recent bone density scans from 2022 and a few months ago demonstrate osteoporosis with progressive bone density loss over several years.   She has a history of two fractures: a wrist fracture from ice skating and a rib fracture after falling onto a sofa while avoiding a dog.    She previously took Fosamax for approximately 1-2 years around 6220-9518    Takes Vitamin D 5000 units daily and uses an estrogen patch, which she has been on since age 48 when had hysterectomy  She is maintained on minimum dose due to headaches experienced with higher doses.    No calcium supplementation  Some leafy green vegetables and occasional dairy    FAMILY HISTORY:  Her mother is 102 years old with no history of osteoporosis.    Regular dental visits  No planned procedures      Yoga for 4 minutes daily  Denies falls    No steroids  Does not think she has lost height      DXA 12/2024:  Lumbar spine (L1-L4):               T-score is -1.4, and Z-score is +0.8.   Compared with previous DXA, BMD at the lumbar spine has remained stable.   Femoral neck:                          T-score is -2.4, and Z-score is -0.5.   Total hip:                                T-score is -2.3, and Z-score is -0.7.   Compared with previous DXA, BMD at the total hip has declined by 4.0%.      Fracture Risk (FRAX)   34% risk of a major osteoporotic fracture in the next 10 years.   14% risk of hip fracture in the next 10 years.     Impression:   *Osteoporosis based on T-score between -1.0 and -2.5 and elevated risk based on FRAX  *Fracture risk is very high due to calculated 10 year risk of hip fracture >4.5% (FRAX).     RECOMMENDATIONS:  *Daily calcium intake 1716-3283 mg, dietary sources preferred; Vitamin D 2600-1402 IU daily.  *Weight bearing  exercise and fall precautions.  *Given very high fracture risk, would consider anabolic agents (including teriparatide, abaloparatide, or romosozumab), denosumab or zoledronic acid as the preferred treatment options. Oral bisphosphonates can be considered as second-line therapy.  *Repeat BMD in 2 years.         Current Medications[1]      Physical Exam    Vitals: Blood pressure: 132/86.       Vitals:    04/28/25 1048   BP: 132/86     Wt Readings from Last 3 Encounters:   04/28/25 1048 60.4 kg (133 lb 2.5 oz)   11/25/24 1319 60 kg (132 lb 4.4 oz)   08/26/24 1038 61.5 kg (135 lb 9.3 oz)     Body mass index is 24.35 kg/m².    Well appearing  Walks without assistance or gait impairment  No kyphosis  No tenderness to spinous processes    LABS    Chemistry        Component Value Date/Time     08/28/2024 0700    K 4.1 08/28/2024 0700     08/28/2024 0700    CO2 25 08/28/2024 0700    BUN 14 08/28/2024 0700    CREATININE 0.7 08/28/2024 0700    GLU 99 08/28/2024 0700        Component Value Date/Time    CALCIUM 9.0 08/28/2024 0700    ALKPHOS 74 08/28/2024 0700    AST 29 08/28/2024 0700    ALT 46 (H) 08/28/2024 0700    BILITOT 0.6 08/28/2024 0700    ESTGFRAFRICA >60 04/02/2022 0940    EGFRNONAA >60 04/02/2022 0940            Lab Results   Component Value Date    QOTTOFFB69SA 31 08/28/2024    HLLBZMWQ59DW 41 11/10/2023    MKWBLMLY60LZ 17 (L) 04/02/2022    CALCIUM 9.0 08/28/2024    CALCIUM 9.3 11/10/2023    CALCIUM 8.9 04/02/2022    ALKPHOS 74 08/28/2024    ALKPHOS 75 11/10/2023    ALKPHOS 71 04/02/2022    TSH 3.416 08/28/2024       1. Osteoporosis, postmenopausal  Ambulatory referral/consult to Endocrinology    Calcium, Timed Urine [facility]; 24    Creatinine, urine, timed    PTH, Intact    Renal Function Panel    Vitamin D      2. Vitamin D deficiency disease              Assessment & Plan    IMPRESSION:    PLAN SUMMARY:  - Adjust Vitamin D dosage based on pending lab results  - Continue yoga practice, incorporate more  balance poses  - Add walking for weight-bearing exercise  - Track daily calcium intake using provided food chart  - Ordered labs: Vitamin D level, calcium level, parathyroid hormone, 24-hour urine calcium excretion  - Started calcium carbonate supplement, dose to be determined  - Contact office with questions or concerns about osteoporosis treatment plan  - Follow up for Reclast (zoledronic acid) IV infusion if patient decides to proceed with treatment    OSTEOPOROSIS  VITAMIN D DEFICIENCY  - Osteoporosis diagnosed based on DEXA scans from 2022 and recent months, showing consistent loss over several years.  - High fracture risk score: 34% for total fracture, 15% for hip fracture (both considered very high risk).  - Recommend Reclast (zoledronic acid) IV infusion annually for 3 years as first-line therapy due to high fracture risk, discussed mechanism, administration, frequency, duration, and potential side effects including post-infusion flu-like symptoms, rare risks of osteonecrosis of jaw and atypical femur fractures.  - Considered oral bisphosphonates (e.g. Fosamax) as second-line, less effective given bone density.  - Continued low-dose estrogen patch unlikely to provide sufficient bone protection alone given progressive loss    - Explained osteoporosis pathophysiology, risk factors, and potential consequences (e.g. debilitating fractures, mortality risk with hip fractures).  - Explained benefits of weight-bearing exercise for bone density. Recommend adding some walking for additional weight-bearing exercise as able  - Patient to track approximate daily calcium intake from diet using provided food chart.  - Started calcium carbonate supplement, dose to be determined based on dietary intake assessment (target 1200 mg total daily calcium intake).  - Vitamin D dosage to be adjusted pending lab results (current dose 5000 IU daily may be higher than needed).  - Ordered labs to rule out secondary causes of  osteoporosis including Vitamin D level, calcium level, parathyroid hormone, and 24-hour urine collection for calcium excretion.  - Follow up for Reclast (zoledronic acid) IV infusion if patient decides to proceed with treatment after consideration.    FOLLOW-UP:  - Contact office with any questions or concerns about osteoporosis treatment plan.               RTC 1 year          Caryl Chang MD    This note was generated with the assistance of ambient listening technology. Verbal consent was obtained by the patient and accompanying visitor(s) for the recording of patient appointment to facilitate this note. I attest to having reviewed and edited the generated note for accuracy, though some syntax or spelling errors may persist. Please contact the author of this note for any clarification.          Visit today included increased complexity associated with the care of the problems addressed and managing the longitudinal care of the patient due to the serious and/or complex managed problems         [1]   Current Outpatient Medications:     diphenhydramine HCl (BENADRYL ALLERGY ORAL), Take by mouth as needed., Disp: , Rfl:     estradioL (VIVELLE-DOT) 0.025 mg/24 hr, APPLY 1 PATCH TOPICALLY TO THE SKIN 2 TIMES A WEEK, Disp: 24 patch, Rfl: 4    fexofenadine HCl (ALLEGRA ORAL), Take by mouth as needed., Disp: , Rfl:     loratadine (CLARITIN) 5 mg chewable tablet, Take 5 mg by mouth as needed., Disp: , Rfl:     mv-mn/iron/folic acid/herb 190 (VITAMIN D3 COMPLETE ORAL), Take by mouth once daily., Disp: , Rfl:     valACYclovir (VALTREX) 1000 MG tablet, 2 pills the repeat 2 pills 12 hrs later (Patient not taking: Reported on 4/28/2025), Disp: 30 tablet, Rfl: 6

## 2025-05-01 ENCOUNTER — LAB VISIT (OUTPATIENT)
Dept: LAB | Facility: HOSPITAL | Age: 70
End: 2025-05-01
Attending: INTERNAL MEDICINE
Payer: COMMERCIAL

## 2025-05-01 DIAGNOSIS — M81.0 OSTEOPOROSIS, POSTMENOPAUSAL: ICD-10-CM

## 2025-05-01 LAB
CALCIUM 24H UR-MRATE: 9 MG/HR (ref 4–12)
CALCIUM UR-MCNC: 12.9 MG/DL
CREAT 24H UR-MRATE: 37.3 MG/HR (ref 40–75)
CREAT UR-MCNC: 56 MG/DL (ref 15–325)
TOTAL HOURS OF COLLECTION (OHS): 24 HR
TOTAL HOURS OF COLLECTION (OHS): 24 HR
TOTAL VOLUME  (OHS): 1600 ML
TOTAL VOLUME  (OHS): 1600 ML
URINE CALCIUM ABSOLUTE (OHS): 206 MG/SPEC
URINE CREATININE ABSOLUTE (OHS): 896 MG/SPEC

## 2025-05-01 PROCEDURE — 82570 ASSAY OF URINE CREATININE: CPT

## 2025-05-01 PROCEDURE — 82340 ASSAY OF CALCIUM IN URINE: CPT

## 2025-08-21 ENCOUNTER — TELEPHONE (OUTPATIENT)
Dept: ENDOCRINOLOGY | Facility: CLINIC | Age: 70
End: 2025-08-21
Payer: COMMERCIAL

## 2025-08-21 RX ORDER — HEPARIN 100 UNIT/ML
500 SYRINGE INTRAVENOUS
OUTPATIENT
Start: 2025-08-21

## 2025-08-21 RX ORDER — SODIUM CHLORIDE 0.9 % (FLUSH) 0.9 %
10 SYRINGE (ML) INJECTION
OUTPATIENT
Start: 2025-08-21

## 2025-08-21 RX ORDER — ZOLEDRONIC ACID 5 MG/100ML
5 INJECTION, SOLUTION INTRAVENOUS
OUTPATIENT
Start: 2025-08-21 | End: 2025-08-21

## 2025-08-22 ENCOUNTER — TELEPHONE (OUTPATIENT)
Dept: INFECTIOUS DISEASES | Facility: HOSPITAL | Age: 70
End: 2025-08-22
Payer: COMMERCIAL